# Patient Record
Sex: FEMALE | Race: WHITE | NOT HISPANIC OR LATINO | Employment: FULL TIME | URBAN - NONMETROPOLITAN AREA
[De-identification: names, ages, dates, MRNs, and addresses within clinical notes are randomized per-mention and may not be internally consistent; named-entity substitution may affect disease eponyms.]

---

## 2017-04-19 ENCOUNTER — HISTORY (OUTPATIENT)
Dept: EMERGENCY MEDICINE | Facility: OTHER | Age: 27
End: 2017-04-19

## 2018-01-29 ENCOUNTER — DOCUMENTATION ONLY (OUTPATIENT)
Dept: FAMILY MEDICINE | Facility: OTHER | Age: 28
End: 2018-01-29

## 2018-01-29 RX ORDER — PREDNISONE 10 MG/1
TABLET ORAL
COMMUNITY
Start: 2017-04-19 | End: 2021-09-08

## 2021-09-08 ENCOUNTER — HOSPITAL ENCOUNTER (INPATIENT)
Facility: CLINIC | Age: 31
LOS: 4 days | Discharge: HOME | End: 2021-09-13
Attending: PSYCHIATRY & NEUROLOGY | Admitting: PSYCHIATRY & NEUROLOGY
Payer: MEDICAID

## 2021-09-08 DIAGNOSIS — Z20.822 COVID-19 RULED OUT BY LABORATORY TESTING: ICD-10-CM

## 2021-09-08 DIAGNOSIS — F19.20 CHEMICAL DEPENDENCY (H): ICD-10-CM

## 2021-09-08 DIAGNOSIS — F10.20 UNCOMPLICATED ALCOHOL DEPENDENCE (H): ICD-10-CM

## 2021-09-08 LAB
ALBUMIN SERPL-MCNC: 4 G/DL (ref 3.4–5)
ALCOHOL BREATH TEST: 0.13 (ref 0–0.01)
ALP SERPL-CCNC: 90 U/L (ref 40–150)
ALT SERPL W P-5'-P-CCNC: 237 U/L (ref 0–50)
AMPHETAMINES UR QL SCN: ABNORMAL
ANION GAP SERPL CALCULATED.3IONS-SCNC: 6 MMOL/L (ref 3–14)
AST SERPL W P-5'-P-CCNC: 138 U/L (ref 0–45)
BARBITURATES UR QL: ABNORMAL
BASOPHILS # BLD AUTO: 0.1 10E3/UL (ref 0–0.2)
BASOPHILS NFR BLD AUTO: 1 %
BENZODIAZ UR QL: ABNORMAL
BILIRUB SERPL-MCNC: 0.4 MG/DL (ref 0.2–1.3)
BUN SERPL-MCNC: 9 MG/DL (ref 7–30)
CALCIUM SERPL-MCNC: 8.7 MG/DL (ref 8.5–10.1)
CANNABINOIDS UR QL SCN: ABNORMAL
CHLORIDE BLD-SCNC: 107 MMOL/L (ref 94–109)
CO2 SERPL-SCNC: 27 MMOL/L (ref 20–32)
COCAINE UR QL: ABNORMAL
CREAT SERPL-MCNC: 0.58 MG/DL (ref 0.52–1.04)
EOSINOPHIL # BLD AUTO: 0 10E3/UL (ref 0–0.7)
EOSINOPHIL NFR BLD AUTO: 1 %
ERYTHROCYTE [DISTWIDTH] IN BLOOD BY AUTOMATED COUNT: 13.9 % (ref 10–15)
GFR SERPL CREATININE-BSD FRML MDRD: >90 ML/MIN/1.73M2
GLUCOSE BLD-MCNC: 113 MG/DL (ref 70–99)
HCG UR QL: NEGATIVE
HCT VFR BLD AUTO: 41.4 % (ref 35–47)
HGB BLD-MCNC: 13.7 G/DL (ref 11.7–15.7)
IMM GRANULOCYTES # BLD: 0 10E3/UL
IMM GRANULOCYTES NFR BLD: 0 %
LYMPHOCYTES # BLD AUTO: 1.3 10E3/UL (ref 0.8–5.3)
LYMPHOCYTES NFR BLD AUTO: 20 %
MCH RBC QN AUTO: 31.9 PG (ref 26.5–33)
MCHC RBC AUTO-ENTMCNC: 33.1 G/DL (ref 31.5–36.5)
MCV RBC AUTO: 96 FL (ref 78–100)
MONOCYTES # BLD AUTO: 0.4 10E3/UL (ref 0–1.3)
MONOCYTES NFR BLD AUTO: 6 %
NEUTROPHILS # BLD AUTO: 4.8 10E3/UL (ref 1.6–8.3)
NEUTROPHILS NFR BLD AUTO: 72 %
NRBC # BLD AUTO: 0 10E3/UL
NRBC BLD AUTO-RTO: 0 /100
OPIATES UR QL SCN: ABNORMAL
PLATELET # BLD AUTO: 274 10E3/UL (ref 150–450)
POTASSIUM BLD-SCNC: 3.8 MMOL/L (ref 3.4–5.3)
PROT SERPL-MCNC: 7.6 G/DL (ref 6.8–8.8)
RBC # BLD AUTO: 4.3 10E6/UL (ref 3.8–5.2)
SARS-COV-2 RNA RESP QL NAA+PROBE: NEGATIVE
SODIUM SERPL-SCNC: 140 MMOL/L (ref 133–144)
WBC # BLD AUTO: 6.6 10E3/UL (ref 4–11)

## 2021-09-08 PROCEDURE — 250N000013 HC RX MED GY IP 250 OP 250 PS 637: Performed by: EMERGENCY MEDICINE

## 2021-09-08 PROCEDURE — 99285 EMERGENCY DEPT VISIT HI MDM: CPT | Mod: 25 | Performed by: PSYCHIATRY & NEUROLOGY

## 2021-09-08 PROCEDURE — U0003 INFECTIOUS AGENT DETECTION BY NUCLEIC ACID (DNA OR RNA); SEVERE ACUTE RESPIRATORY SYNDROME CORONAVIRUS 2 (SARS-COV-2) (CORONAVIRUS DISEASE [COVID-19]), AMPLIFIED PROBE TECHNIQUE, MAKING USE OF HIGH THROUGHPUT TECHNOLOGIES AS DESCRIBED BY CMS-2020-01-R: HCPCS | Performed by: PSYCHIATRY & NEUROLOGY

## 2021-09-08 PROCEDURE — 85025 COMPLETE CBC W/AUTO DIFF WBC: CPT | Performed by: PSYCHIATRY & NEUROLOGY

## 2021-09-08 PROCEDURE — C9803 HOPD COVID-19 SPEC COLLECT: HCPCS | Performed by: PSYCHIATRY & NEUROLOGY

## 2021-09-08 PROCEDURE — 82075 ASSAY OF BREATH ETHANOL: CPT | Performed by: PSYCHIATRY & NEUROLOGY

## 2021-09-08 PROCEDURE — 99284 EMERGENCY DEPT VISIT MOD MDM: CPT | Performed by: PSYCHIATRY & NEUROLOGY

## 2021-09-08 PROCEDURE — 36415 COLL VENOUS BLD VENIPUNCTURE: CPT | Performed by: PSYCHIATRY & NEUROLOGY

## 2021-09-08 PROCEDURE — 250N000013 HC RX MED GY IP 250 OP 250 PS 637: Performed by: PSYCHIATRY & NEUROLOGY

## 2021-09-08 PROCEDURE — 80307 DRUG TEST PRSMV CHEM ANLYZR: CPT | Performed by: PSYCHIATRY & NEUROLOGY

## 2021-09-08 PROCEDURE — 90791 PSYCH DIAGNOSTIC EVALUATION: CPT

## 2021-09-08 PROCEDURE — 81025 URINE PREGNANCY TEST: CPT | Performed by: PSYCHIATRY & NEUROLOGY

## 2021-09-08 PROCEDURE — 80053 COMPREHEN METABOLIC PANEL: CPT | Performed by: PSYCHIATRY & NEUROLOGY

## 2021-09-08 RX ORDER — DIAZEPAM 5 MG
5 TABLET ORAL ONCE
Status: COMPLETED | OUTPATIENT
Start: 2021-09-08 | End: 2021-09-08

## 2021-09-08 RX ORDER — DIAZEPAM 5 MG
5 TABLET ORAL ONCE
Status: DISCONTINUED | OUTPATIENT
Start: 2021-09-08 | End: 2021-09-08 | Stop reason: CLARIF

## 2021-09-08 RX ADMIN — NICOTINE POLACRILEX 4 MG: 4 GUM, CHEWING BUCCAL at 18:10

## 2021-09-08 RX ADMIN — DIAZEPAM 5 MG: 5 TABLET ORAL at 15:45

## 2021-09-08 ASSESSMENT — ENCOUNTER SYMPTOMS
NEUROLOGICAL NEGATIVE: 1
AGITATION: 0
SLEEP DISTURBANCE: 1
CONSTITUTIONAL NEGATIVE: 1
CARDIOVASCULAR NEGATIVE: 1
HYPERACTIVE: 0
GASTROINTESTINAL NEGATIVE: 1
MUSCULOSKELETAL NEGATIVE: 1
RESPIRATORY NEGATIVE: 1
HALLUCINATIONS: 0
EYES NEGATIVE: 1

## 2021-09-08 ASSESSMENT — MIFFLIN-ST. JEOR: SCORE: 1370.43

## 2021-09-08 ASSESSMENT — LIFESTYLE VARIABLES: INTOXICATION: 1
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
## 2021-09-08 NOTE — ED PROVIDER NOTES
[facility] EMERGENCY DEPARTMENT ([facility])   September 8, 2021  [address]   History     Chief Complaint   Patient presents with     Alcohol Intoxication     States last drink was #@ 1200 Noon today.  Drinks selzers.  Takes shots also.  Case of beer per day.     The history is provided by the patient and medical records.     Patricia Alberto is a 30 year old female who presents with alcohol intoxication. Patient's last drink was at noon today. She has been drinking hard seltzers, case of beer, and shots of liquor. She last drank at noon today. She breathalized 0.13 on arrival. She has not been in detox nor treatment. She feels her drinking has spiraled out of control. She wakes up feeling nauseous and throwing up, feeling sick. She is concerned about her health and now wants help. She has history of PTSD and ADHD. She denies feeling suicidal nor having hallucinations. She feels she only needs to get into detox and is looking for MI/CD treatment. She admits to engaging in self-cutting behavior when intoxicated. She feels in emotional and behavioral control presently. She admits to smoking THC daily and occasion cocaine use. She last used 5 days ago.    Patient denies acute medical concerns presently. She has no history of withdrawal seizures or DTs. She has had her COVID vaccine and denies any COVID symptoms.    Patient's grandmother referred her here as she works as the [employer] in the [employer] [facility].    Please see DEC Crisis Assessment on 9/8/21 in Epic for further details.    PAST MEDICAL HISTORY:   Past Medical History:   Diagnosis Date     Attention-deficit hyperactivity disorder     post-traumatic stress disorder followed by Judi Puentes.     Concussion with loss of consciousness     and LOC late August after falling down steps, air lifted to [facility] and hospitalized x 4 days.     Encounter for general adult medical examination without abnormal findings     08/27/09     Tinea unguium     No  Comments Provided       PAST SURGICAL HISTORY:   Past Surgical History:   Procedure Laterality Date     CHOLECYSTECTOMY  2015     OTHER SURGICAL HISTORY      [id number],NO PREVIOUS SURGERY       Past medical history, past surgical history, medications, and allergies were reviewed with the patient.     FAMILY HISTORY:   Family History   Problem Relation Age of Onset     Family History Negative Mother         Good Health,44 yrs old     Other - See Comments Father         murdered at 52     Family History Negative Other         Good Health     Family History Negative Other         Good Health     Blood Disease No family hx of         Blood Disease     Heart Disease No family hx of         Heart Disease     Cholelithiasis Father         Cholecystecomy       SOCIAL HISTORY:   Social History     Tobacco Use     Smoking status: Current Every Day Smoker     Packs/day: 0.50     Years: 1.00     Pack years: 0.50     Types: Cigarettes     Smokeless tobacco: Never Used     Tobacco comment: Also vaps nicotine   Substance Use Topics     Alcohol use: Yes     Alcohol/week: 0.0 standard drinks     Social history was reviewed with the patient.       Patient's Medications   New Prescriptions    No medications on file   Previous Medications    AMPHETAMINE-DEXTROAMPHETAMINE (ADDERALL XR PO)        PREDNISONE (DELTASONE) 10 MG TABLET    Take 1 tablet by mouth once daily with a meal. Take 40 mg daily for 1 week, then 30 mg daily for 3 days, then 20 mg daily for 3 days, then 10 mg daily for 3 days.   Modified Medications    No medications on file   Discontinued Medications    No medications on file          Allergies   Allergen Reactions     Morphine Rash        Review of Systems   Constitutional: Negative.    HENT: Negative.    Eyes: Negative.    Respiratory: Negative.    Cardiovascular: Negative.    Gastrointestinal: Negative.    Genitourinary: Negative.    Musculoskeletal: Negative.    Skin: Negative.    Neurological: Negative.   "  Psychiatric/Behavioral: Positive for self-injury and sleep disturbance. Negative for agitation, behavioral problems, hallucinations and suicidal ideas. The patient is not hyperactive.    All other systems reviewed and are negative.        Physical Exam   BP: 130/85  Pulse: 85  Temp: 98.3  F (36.8  C)  Resp: 16  Height: 160 cm (5' 3\")  Weight: 68.1 kg (150 lb 3.2 oz)  SpO2: 97 %      Physical Exam  Vitals and nursing note reviewed.   HENT:      Head: Normocephalic.   Eyes:      Pupils: Pupils are equal, round, and reactive to light.   Pulmonary:      Effort: Pulmonary effort is normal.   Musculoskeletal:         General: Normal range of motion.      Cervical back: Normal range of motion.   Neurological:      General: No focal deficit present.      Mental Status: She is alert.   Psychiatric:         Attention and Perception: Attention and perception normal. She does not perceive auditory or visual hallucinations.         Mood and Affect: Mood and affect normal.         Speech: Speech normal.         Behavior: Behavior normal. Behavior is not agitated, aggressive, hyperactive or combative. Behavior is cooperative.         Thought Content: Thought content normal. Thought content is not paranoid or delusional. Thought content does not include homicidal or suicidal ideation.         Cognition and Memory: Cognition and memory normal.         Judgment: Judgment normal.         ED Course        Procedures              Results for orders placed or performed during the hospital encounter of 09/08/21 (from the past 24 hour(s))   Alcohol breath test POCT   Result Value Ref Range    Alcohol Breath Test 0.13 (A) 0.00 - 0.01   CBC with platelets differential    Narrative    The following orders were created for panel order CBC with platelets differential.  Procedure                               Abnormality         Status                     ---------                               -----------         ------                   "   CBC with platelets and d...[[id number]]                                                   Please view results for these tests on the individual orders.     Medications   diazepam (VALIUM) tablet 5 mg (5 mg Oral Given 9/8/21 1048)             Assessments & Plan (with Medical Decision Making)   Patient with alcohol dependence who feels her drinking has gotten out of control, and she is getting sick in the morning from alcohol adverse effects and withdrawal. She seeks help with detox and subsequent treatment. Patient is voluntary for inpatient medical detox. She is referred for admission here. She is medically cleared.    I have reviewed the nursing notes.    I have reviewed the findings, diagnosis, plan and need for follow up with the patient.    New Prescriptions    No medications on file       Final diagnoses:   Uncomplicated alcohol dependence (H)   Chemical dependency (H)         9/8/2021   [facility] EMERGENCY DEPARTMENT     Chuckie Beltran MD  09/08/21 [id number]

## 2021-09-09 PROBLEM — F19.20 CHEMICAL DEPENDENCY (H): Status: ACTIVE | Noted: 2021-09-09

## 2021-09-09 PROBLEM — F10.20 UNCOMPLICATED ALCOHOL DEPENDENCE (H): Status: ACTIVE | Noted: 2021-09-09

## 2021-09-09 PROCEDURE — 99207 PR CONSULT E&M CHANGED TO SUBSEQUENT LEVEL: CPT | Performed by: PHYSICIAN ASSISTANT

## 2021-09-09 PROCEDURE — 99223 1ST HOSP IP/OBS HIGH 75: CPT | Mod: AI | Performed by: PSYCHIATRY & NEUROLOGY

## 2021-09-09 PROCEDURE — HZ2ZZZZ DETOXIFICATION SERVICES FOR SUBSTANCE ABUSE TREATMENT: ICD-10-PCS | Performed by: PSYCHIATRY & NEUROLOGY

## 2021-09-09 PROCEDURE — 99232 SBSQ HOSP IP/OBS MODERATE 35: CPT | Performed by: PHYSICIAN ASSISTANT

## 2021-09-09 PROCEDURE — 250N000013 HC RX MED GY IP 250 OP 250 PS 637: Performed by: PSYCHIATRY & NEUROLOGY

## 2021-09-09 PROCEDURE — 128N000004 HC R&B CD ADULT

## 2021-09-09 RX ORDER — FOLIC ACID 1 MG/1
1 TABLET ORAL DAILY
Status: DISCONTINUED | OUTPATIENT
Start: 2021-09-09 | End: 2021-09-13 | Stop reason: HOSPADM

## 2021-09-09 RX ORDER — AMOXICILLIN 250 MG
1 CAPSULE ORAL 2 TIMES DAILY PRN
Status: DISCONTINUED | OUTPATIENT
Start: 2021-09-09 | End: 2021-09-13 | Stop reason: HOSPADM

## 2021-09-09 RX ORDER — PRAZOSIN HYDROCHLORIDE 1 MG/1
1 CAPSULE ORAL AT BEDTIME
Status: DISCONTINUED | OUTPATIENT
Start: 2021-09-09 | End: 2021-09-13 | Stop reason: HOSPADM

## 2021-09-09 RX ORDER — TRAZODONE HYDROCHLORIDE 50 MG/1
50 TABLET, FILM COATED ORAL
Status: DISCONTINUED | OUTPATIENT
Start: 2021-09-09 | End: 2021-09-13 | Stop reason: HOSPADM

## 2021-09-09 RX ORDER — HYDROXYZINE HYDROCHLORIDE 25 MG/1
25 TABLET, FILM COATED ORAL EVERY 4 HOURS PRN
Status: DISCONTINUED | OUTPATIENT
Start: 2021-09-09 | End: 2021-09-13 | Stop reason: HOSPADM

## 2021-09-09 RX ORDER — LANOLIN ALCOHOL/MO/W.PET/CERES
100 CREAM (GRAM) TOPICAL DAILY
Status: DISCONTINUED | OUTPATIENT
Start: 2021-09-09 | End: 2021-09-13 | Stop reason: HOSPADM

## 2021-09-09 RX ORDER — ACETAMINOPHEN 325 MG/1
650 TABLET ORAL EVERY 4 HOURS PRN
Status: DISCONTINUED | OUTPATIENT
Start: 2021-09-09 | End: 2021-09-09

## 2021-09-09 RX ORDER — ATENOLOL 50 MG/1
50 TABLET ORAL DAILY PRN
Status: DISCONTINUED | OUTPATIENT
Start: 2021-09-09 | End: 2021-09-13 | Stop reason: HOSPADM

## 2021-09-09 RX ORDER — DIAZEPAM 5 MG
5-20 TABLET ORAL EVERY 30 MIN PRN
Status: DISCONTINUED | OUTPATIENT
Start: 2021-09-09 | End: 2021-09-13 | Stop reason: HOSPADM

## 2021-09-09 RX ORDER — MULTIPLE VITAMINS W/ MINERALS TAB 9MG-400MCG
1 TAB ORAL DAILY
Status: DISCONTINUED | OUTPATIENT
Start: 2021-09-09 | End: 2021-09-13 | Stop reason: HOSPADM

## 2021-09-09 RX ORDER — MAGNESIUM HYDROXIDE/ALUMINUM HYDROXICE/SIMETHICONE 120; 1200; 1200 MG/30ML; MG/30ML; MG/30ML
30 SUSPENSION ORAL EVERY 4 HOURS PRN
Status: DISCONTINUED | OUTPATIENT
Start: 2021-09-09 | End: 2021-09-13 | Stop reason: HOSPADM

## 2021-09-09 RX ORDER — NICOTINE 21 MG/24HR
1 PATCH, TRANSDERMAL 24 HOURS TRANSDERMAL DAILY
Status: DISCONTINUED | OUTPATIENT
Start: 2021-09-09 | End: 2021-09-13 | Stop reason: HOSPADM

## 2021-09-09 RX ORDER — MIRTAZAPINE 15 MG/1
15 TABLET, FILM COATED ORAL AT BEDTIME
Status: DISCONTINUED | OUTPATIENT
Start: 2021-09-09 | End: 2021-09-13 | Stop reason: HOSPADM

## 2021-09-09 RX ADMIN — MIRTAZAPINE 15 MG: 15 TABLET, FILM COATED ORAL at 20:33

## 2021-09-09 RX ADMIN — DIAZEPAM 10 MG: 5 TABLET ORAL at 16:35

## 2021-09-09 RX ADMIN — PRAZOSIN HYDROCHLORIDE 1 MG: 1 CAPSULE ORAL at 20:33

## 2021-09-09 RX ADMIN — DIAZEPAM 10 MG: 5 TABLET ORAL at 20:33

## 2021-09-09 RX ADMIN — DIAZEPAM 10 MG: 5 TABLET ORAL at 09:05

## 2021-09-09 RX ADMIN — DIAZEPAM 10 MG: 5 TABLET ORAL at 06:47

## 2021-09-09 RX ADMIN — DIAZEPAM 10 MG: 5 TABLET ORAL at 12:45

## 2021-09-09 RX ADMIN — NICOTINE 1 PATCH: 14 PATCH, EXTENDED RELEASE TRANSDERMAL at 09:05

## 2021-09-09 ASSESSMENT — ACTIVITIES OF DAILY LIVING (ADL)
HYGIENE/GROOMING: INDEPENDENT
DRESS: STREET CLOTHES;INDEPENDENT
ORAL_HYGIENE: INDEPENDENT
ORAL_HYGIENE: INDEPENDENT
LAUNDRY: WITH SUPERVISION
DRESS: INDEPENDENT
HYGIENE/GROOMING: HANDWASHING;INDEPENDENT

## 2021-09-09 NOTE — PLAN OF CARE
Behavioral Team Discussion: (9/9/2021)    Continued Stay Criteria/Rationale: Patient admitted for alcohol withdrawal, complicated.  Plan: The following services will be provided to the patient; psychiatric assessment, medication management, therapeutic milieu, individual and group support, and skills groups.   Participants: 3A Provider: Dr. Constance Edgar MD; 3A RN's: Wellington Robbins, RN; 3A CM's: Rae Sethi [facility]  Summary/Recommendation: Providers will assess today for treatment recommendations, discharge planning, and aftercare plans. CM will meet with pt for discharge planning.   Medical/Physical: Internal medicine consult to be completed 9/9/2021.  Precautions:   Behavioral Orders   Procedures     Code 1 - Restrict to Unit     Routine Programming     As clinically indicated     Status 15     Every 15 minutes.     Withdrawal precautions     Rationale for change in precautions or plan: N/A  Progress: No Change.

## 2021-09-09 NOTE — PHARMACY-ADMISSION MEDICATION HISTORY
Admission Medication History Completed by Pharmacy    See [facility] Admission Navigator for allergy information, preferred outpatient pharmacy, prior to admission medications and immunization status.     Medication History Sources:     Patient    Fill history    Changes made to PTA medication list (reason):    Added: None    Deleted:   o Amphetamine-Dextroamphetamine (Adderall XR); No instructions listed - Patient reports that she has not taken this medication in over a year  o Prednisone 10 mg tablet; Take 40 mg daily for 1 week, then 30 mg daily for 3 days, then 20 mg daily for 3 days, then 10 mg daily for 3 days - Patient reports that she has not taken this medication in a couple years.    Changed: None    Additional Information:    Patient reports not taking any routine medications/supplements/vitamins. Her fill history reflects this.    Prior to Admission medications    Not on File       Date completed: 09/08/21    Medication history completed by: JEREMY Cook, Pharmacy Intern

## 2021-09-09 NOTE — PLAN OF CARE
"  Problem: Adult Inpatient Plan of Care  Goal: Plan of Care Review  Outcome: No Change     Patricia is a 30 year old female admitted from [facility] due to alcohol intoxication. She is seeking for detox.     Pt.reports she has been drinking \"all my life\". She has never been sober for three months. Her liver function tests are elevated. She identified recent stressors that worsened her drinking as: her best friend committed suicide a few months ago; lost her job on Saturday (9/4/2021) due to going to work drunk; and going through a \"messy\"break up from a five year old relationship.     She has a history of self cutting while intoxicated. Has superficial wounds on left wrist. Wounds look clean, dry, and intact upon assessment. No concerns for infection at this point.     Started on MSSA with valium to manage withdrawal symptoms. Denied SI/SIB/hallucinations. Denied any withdrawal seizures and DTs history. Has mental health diagnosis of PTSD and ADHD. Will continue to monitor.   "

## 2021-09-09 NOTE — ED NOTES
Patricia MARTEL Remy  September 8, 2021  SAFE Note    Critical Safety Issues:       Current Suicidal Ideation/Self-Injurious Concerns/Methods: None - N/A      Current or Historical Inappropriate Sexual Behavior: No      Current or Historical Aggression/Homicidal Ideation: None - N/A      Triggers: N/A    Updated care team: Yes:     For additional details see full [facility] assessment.       PADMINI Hidalgo

## 2021-09-09 NOTE — H&P
Patricia Alberto is a 30 year old female     History was provided by MARCK who was a fair  historian.   CHIEF COMPLAINT: Stop drinking    HISTORY OF PRESENT ILLNESS:    Patient is 30-year-old  female.  Patient came to the emergency room wanting help for her drinking.  Patient reports she will showed up intoxicated to work and was fired she has had a bad break-up of family intervened and asked her to come get help.  Patient has been using the following substances: Alcohol  Started at age 13, became a problem at 2021    Patient has tolerance, withdrawal, progressive use, loss of control, spending more time and more amount than intended. Patient has made attempts to quit, is experiencing cravings, and reports negative consequences.  She has no history of DTs or seizures  He wakes up he is nauseous throws up   She has been using marijuana daily    She has been using cocaine and snorting it since June    She has tolerance progressive loss of control               Denies thoughts of suicide or harming others.  She has however engage in self-injurious behavior while intoxicated    Denies auditory or visual hallucinations.     Patient smokes half a pack a day    Patient denied any gambling    She denies IV use or any other illicit drugs  PSYCHIATRIC REVIEW OF SYSTEMS:         Psychiatric Review of Systems:   Depression:   deneid any  depressed mood, suicidal ideation, decreased interest, changes in sleep, changes in appetite, guilt, hopelessness, helplessness, impaired concentration, decreased energy, irritability.   Denies: depressed mood, suicidal ideation, decreased interest, changes in sleep, changes in appetite, guilt, hopelessness, helplessness, impaired concentration, decreased energy, irritability.  Cecelia:   deied  sleeplessness, impulsiveness, racing thoughts, increased goal-directed activities, pressured speech, increase in energy  Cecelia Feeling euphoric,Distractible,Impulsive,Grandiose,Talking  excessively,Have energy without sleeping,Mood swings,Irritability  Denies: sleeplessness, increased goal-directed activities, abrupt increase in energy, pressured speech  Psychosis:     Denies: visual hallucinations, auditory hallucinations, paranoia  Anxiety:   Reports: excessive worries that are difficult to control for the past 6 months,   Chronic anxiety , not able to stop worrying impacting sleep, poor conc, irritable , muscle tension    Anxiety  Pt has following s/o of anxiety  Feeling anxious all the time,Excessive worry,Not able to stop worrying,Impacting sleep,Concentration,Muscle tension,Irritability,Fatigue  Denied any Panic attacks      Denies: worries that are difficult to control for the past 6 months, panic attacks  PTSD:   Reports: re-experiencing past trauma, nightmares, itrust issues, flashbacks,increased arousal, avoidance of traumatic stimuli, impaired function.    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  ED:     Denies: restriction, binging, purging.    H/o adhd     Symptoms of borderline personality disorder include a fear of abandonment, unstable self-image, impulsive behavior, dissociative feeling, intense anger, unstable personal relationships, chronic feelings of boredom, periods of intense depressed mood.              PSYCHIATRIC HISTORY     Previous diagnoses: ptsd, anxiety        Past court commitments: none  SIB /SUICIDE ATTEMPTS NONE  Psych Hosp :none  Outpatient Programs none  Inpatient cd trt none  Out pt cd trt none    PAST PSYCH MED TRIALS    lexapro prozac paxil celexa     SOCIAL HISTORY                                                                         Single no kids , lives roommate   She reports that her lupus her mother left when she was 2 years old she spent a lot of time in the presence. all she witnessed a lot of trauma , her mother and father were fighting a lot.  She also reports she was home and her  father was murdered at the age of 17 she struggles in PTSD from this incident.  She did complete high school because a  but got fired.        Family History:   FAMILY HISTORY:   Family History   Problem Relation Age of Onset     Family History Negative Mother         Good Health,44 yrs old     Other - See Comments Father         murdered at 52     Family History Negative Other         Good Health     Family History Negative Other         Good Health     Blood Disease No family hx of         Blood Disease     Heart Disease No family hx of         Heart Disease     Cholelithiasis Father         Cholecystecomy     Family Mental Health History-  Mother has mental illness     Substance Use Problems - present for mother is  Sober  meth/cocaine              PTA Medications:     No medications prior to admission.          Allergies:     Allergies   Allergen Reactions     Morphine Rash          Labs:     Recent Results (from the past 48 hour(s))   Alcohol breath test POCT    Collection Time: 09/08/21  3:45 PM   Result Value Ref Range    Alcohol Breath Test 0.13 (A) 0.00 - 0.01   Comprehensive metabolic panel    Collection Time: 09/08/21  5:33 PM   Result Value Ref Range    Sodium 140 133 - 144 mmol/L    Potassium 3.8 3.4 - 5.3 mmol/L    Chloride 107 94 - 109 mmol/L    Carbon Dioxide (CO2) 27 20 - 32 mmol/L    Anion Gap 6 3 - 14 mmol/L    Urea Nitrogen 9 7 - 30 mg/dL    Creatinine 0.58 0.52 - 1.04 mg/dL    Calcium 8.7 8.5 - 10.1 mg/dL    Glucose 113 (H) 70 - 99 mg/dL    Alkaline Phosphatase 90 40 - 150 U/L     (H) 0 - 45 U/L     (H) 0 - 50 U/L    Protein Total 7.6 6.8 - 8.8 g/dL    Albumin 4.0 3.4 - 5.0 g/dL    Bilirubin Total 0.4 0.2 - 1.3 mg/dL    GFR Estimate >90 >60 mL/min/1.73m2   Asymptomatic COVID-19 Virus (Coronavirus) by PCR Nasopharyngeal    Collection Time: 09/08/21  5:33 PM    Specimen: Nasopharyngeal; Swab   Result Value Ref Range    SARS CoV2 PCR Negative Negative   CBC with platelets and  "differential    Collection Time: 09/08/21  5:33 PM   Result Value Ref Range    WBC Count 6.6 4.0 - 11.0 10e3/uL    RBC Count 4.30 3.80 - 5.20 10e6/uL    Hemoglobin 13.7 11.7 - 15.7 g/dL    Hematocrit 41.4 35.0 - 47.0 %    MCV 96 78 - 100 fL    MCH 31.9 26.5 - 33.0 pg    MCHC 33.1 31.5 - 36.5 g/dL    RDW 13.9 10.0 - 15.0 %    Platelet Count 274 150 - 450 10e3/uL    % Neutrophils 72 %    % Lymphocytes 20 %    % Monocytes 6 %    % Eosinophils 1 %    % Basophils 1 %    % Immature Granulocytes 0 %    NRBCs per 100 WBC 0 <1 /100    Absolute Neutrophils 4.8 1.6 - 8.3 10e3/uL    Absolute Lymphocytes 1.3 0.8 - 5.3 10e3/uL    Absolute Monocytes 0.4 0.0 - 1.3 10e3/uL    Absolute Eosinophils 0.0 0.0 - 0.7 10e3/uL    Absolute Basophils 0.1 0.0 - 0.2 10e3/uL    Absolute Immature Granulocytes 0.0 <=0.0 10e3/uL    Absolute NRBCs 0.0 10e3/uL   HCG qualitative urine (UPT)    Collection Time: 09/08/21  5:42 PM   Result Value Ref Range    hCG Urine Qualitative Negative Negative   Drug abuse screen 1 urine (ED)    Collection Time: 09/08/21  5:42 PM   Result Value Ref Range    Amphetamines Urine Screen Negative Screen Negative    Barbiturates Urine Screen Negative Screen Negative    Benzodiazepines Urine Screen Negative Screen Negative    Cannabinoids Urine Screen Positive (A) Screen Negative    Cocaine Urine Screen Positive (A) Screen Negative    Opiates Urine Screen Negative Screen Negative         BP (!) 134/90   Pulse 67   Temp 97.6  F (36.4  C) (Temporal)   Resp 16   Ht 1.6 m (5' 3\")   Wt 68.1 kg (150 lb 3.2 oz)   LMP 09/03/2021 (Exact Date)   SpO2 95%   Breastfeeding No   BMI 26.61 kg/m    Weight is 150 lbs 3.2 oz  Body mass index is 26.61 kg/m .    Physical Exam:     ROS: 10 point ROS neg other than the symptoms noted above in the HPI.            Past Medical History:   PAST MEDICAL HISTORY:   Past Medical History:   Diagnosis Date     Attention-deficit hyperactivity disorder     post-traumatic stress disorder followed by " Judi Puentes.     Concussion with loss of consciousness     and LOC late August after falling down steps, air lifted to [facility] and hospitalized x 4 days.     Encounter for general adult medical examination without abnormal findings     08/27/09     Tinea unguium     No Comments Provided       PAST SURGICAL HISTORY:   Past Surgical History:   Procedure Laterality Date     CHOLECYSTECTOMY  2015     OTHER SURGICAL HISTORY      [id number],NO PREVIOUS SURGERY       -    -           MENTAL STATUS EXAM:      Constitutional: General appearance of patient:  Appearance:  awake, alert, appeared as age stated, adequate groomed and slightly unkempt  Attitude:  cooperative  Eye Contact:  good  Mood:  good  Affect:  congruent   Speech:  clear, coherent normal rate   Psychomotor Behavior:  no evidence of tardive dyskinesia, dystonia, or tics  Thought Process:  logical, linear and goal oriented  Associations:  no loose associations  Thought Content:  no evidence of psychotic thought and active suicidal ideation present  Denied any active suicidal /homicidation ideation plan intent   Insight:  fair  Judgment:  fair  Oriented to:  time, person, and place  Attention Span and Concentration:  intact  Recent and Remote Memory:  intact  Language:  english with appropriate syntax and vocabulary  Fund of Knowledge: appropriate  Muscle Strength and Tone: normal  Gait and Station: Normal     There are no abnormal or psychotic thoughts, no preoccupations, no overvalued ideas, no rumination, no obsessions, no compulsions, no somatic concerns, no hypochrondriasis, no ideas of reference, and no delusions.  Patient denies homicidal thoughts.   Patient denies suicidal thoughts.  Patient appears to have good judgment and good insight.     Musculoskeletal: Patient shows no abnormalities of motor activity: there is no tremor, no tic, and no dystonia.  There is no apparent muscle atrophy, strength and tone appear normal, and there are no  abnormal movements.  Patient has normal gait and stance.    DISCUSSION:         Assessment:       Patient has a biological predisposition with family history positive for mental health chemical dependency  Psychologically patient is experiencing abusing alcohol marijuana cocaine she experiences PTSD  Patient has these particular stressors recent break-up being fired from her job  Patient has chronic illness exacerbation leading to hospitalization progression as described.     Patient has been unable to stop using drugs in the community due to both physical and psychological symptoms.  Continued use will put the patient at risk for medical and/or psychiatric complications.      Inpatient psychiatric hospitalization is warranted at this time for safety, stabilization, and possible adjustment in medications.       Diagnoses:    Alcohol use disorder severe  Alcohol withdrawal severe  Marijuana use disorder severe  Nicotine use disorder moderate  Cocaine use disorder moderate  PTSD chronic  Anxiety NOS rule out generalized anxiety disorder  Trichotillomania          Plan:   Problem list  1#alcohol use disorder severe alcohol withdrawal severe     - [facility] protocol using Valium for management of alcohol withdrawal  Patient has a blood pressure 134/90 eating disturbance tremor agitation sweats she scored a 10 and received 10 mg of diazepam since her admission she is required 25 mg of diazepam  - Continue thiamine, folate, and multivitamin daily    2#elevated liver enzymes AST is 138  most likely from alcoholism    3#PTSD patient is willing to take Minipress and Remeron 15 mg patient has tried SSRIs SNRIs and she had a bad reaction SSRIs like Prozac Lexapro believes it made him feel manic  4#she will have symptomatic detox from cocaine        - Consider anti-craving medications prior to discharge. Pt willing to review additional information about both naltrexone and Antabuse.    Alcohol withdrawal nausea prn Zofran as  needed for nausea     hydroxyzine 25 mg q4h prn for acute anxiety  Trazodone 50 mg at bedtime prn for sleep disturbances       Patient has been unable to stop using drugs in the community due to both physical and psychological symptoms.  Continued use will put the patient at risk for medical and/or psychiatric complications.    I HAVE REVIEWED LABS WITH PT AND TALKED ABOUT RESULTS WITH PT  I HAVE REVIEWED AND SUMMARIZED OLD RECORDS including his medication reconcilation of his home medications  and PDMP   I HAVE SPOKEN WITH RN ABOUT MEDICATIONS AND DETOX SCORES  I HAVE SPOKEN WITH CM ABOUT PTS TREATMENT OPTIONS     Discussed in detail about patient's smoking patient was advised to quit patient was told about the impact of smoking.  Patient's willingness to quit was assessed.  I provided methods and skills for cessation including medication management nicotine gum patch.  Patient did not set a quit date.  Patient is interested in quitting .we discussed pharmacotherapy options .patient agreed to take nicotine gum patch lozenge.  We discussed behavioral change techniques when craving nicotine including deep breathing drinking glass of water, taking a walk.            Laboratory/Imaging:    Liver Function Studies -   Recent Labs   Lab Test 09/08/21  1733   PROTTOTAL 7.6   ALBUMIN 4.0   BILITOTAL 0.4   ALKPHOS 90   *   *      Last Comprehensive Metabolic Panel:  Sodium   Date Value Ref Range Status   09/08/2021 140 133 - 144 mmol/L Final   12/07/2015 139 134 - 143 mmol/L      Potassium   Date Value Ref Range Status   09/08/2021 3.8 3.4 - 5.3 mmol/L Final   12/07/2015 3.8 3.5 - 5.1 mmol/L      Chloride   Date Value Ref Range Status   09/08/2021 107 94 - 109 mmol/L Final   12/07/2015 108 (H) 98 - 107 mmol/L      Carbon Dioxide   Date Value Ref Range Status   12/07/2015 28 21 - 31 mmol/L      Carbon Dioxide (CO2)   Date Value Ref Range Status   09/08/2021 27 20 - 32 mmol/L Final     Anion Gap   Date Value Ref  Range Status   09/08/2021 6 3 - 14 mmol/L Final     Glucose   Date Value Ref Range Status   09/08/2021 113 (H) 70 - 99 mg/dL Final   12/07/2015 72 70 - 105 mg/dL      Urea Nitrogen   Date Value Ref Range Status   09/08/2021 9 7 - 30 mg/dL Final   12/07/2015 10 7 - 25 mg/dL      Creatinine   Date Value Ref Range Status   09/08/2021 0.58 0.52 - 1.04 mg/dL Final   12/07/2015 0.80 0.70 - 1.30 mg/dL      GFR Estimate   Date Value Ref Range Status   09/08/2021 >90 >60 mL/min/1.73m2 Final     Comment:     As of July 11, 2021, eGFR is calculated by the CKD-EPI creatinine equation, without race adjustment. eGFR can be influenced by muscle mass, exercise, and diet. The reported eGFR is an estimation only and is only applicable if the renal function is stable.     Calcium   Date Value Ref Range Status   09/08/2021 8.7 8.5 - 10.1 mg/dL Final   12/07/2015 9.8 8.6 - 10.3 mg/dL      Bilirubin Total   Date Value Ref Range Status   09/08/2021 0.4 0.2 - 1.3 mg/dL Final   12/07/2015 0.6 0.3 - 1.0 mg/dL      Alkaline Phosphatase   Date Value Ref Range Status   09/08/2021 90 40 - 150 U/L Final   12/07/2015 40 34 - 104 IU/L      ALT   Date Value Ref Range Status   09/08/2021 237 (H) 0 - 50 U/L Final     AST   Date Value Ref Range Status   09/08/2021 138 (H) 0 - 45 U/L Final                   Medical treatment/interventions:  Medical concerns: As above    - Consults: IM consult placed. Appreciate assistance.     Legal Status: Voluntary     Safety Assessment:   Checks: Status 15  Pt has not required locked seclusion or restraints in the past 24 hours to maintain safety, please refer to RN documentation for further details.    The risks, benefits, alternatives and side effects have been discussed and are understood by the patient.       Patient will be treated in therapeutic milieu with appropriate individual and group therapies as described.  Disposition: Pending clinical stabilization. Pt does  appear interested in COMPLETE DETOX AND DO  "TRT  Length of stay 3-5 days        \"Much or all of the text in this note was generated through the use of Dragon Dictate voice to text software. Errors in spelling or words which appear to be out of contact are unintentional, may be present due having escaped editing\"     "

## 2021-09-09 NOTE — CONSULTS
[facility]  Consult Note - Hospitalist Service       Date of Admission:  9/8/2021    Consult Requested by: Psychiatry  Reason for Consult: Co-manage detox       ASSESSMENT & PLAN:         Patricia Alberto is a 30 year old female with PMH of ADHD and alcohol use disorder who was admitted for detox.     # Alcohol dependence:  Longstanding history of alcohol abuse. Escalating use recently d/t social stressors. Currently reporting mild symptoms of withdrawal. No history of withdrawal seizures or DT's. LFT's abnormal (see below). No other complications noted.  - Management per primary team    # Transaminitis:  Hepatocellular pattern. Bilirubin and Alk Phos normal. No risk factors for viral hepatitis. Completed Hep B vaccine series in 2006 per records. Suspect 2/2 alcohol use despite AST:ALT not 2:1. DDx includes resolving alcoholic hepatitis, alcoholic steatohepatitis, NAFLD, and less likely viral.   - Repeat LFTs in AM         The patient's care was discussed with the Primary team.    JEFF [facility]  Securely message with the Vocera Web Console (learn more here)  Text page via [facility] Paging/Directory      Clinically Significant Risk Factors Present on Admission                   ______________________________________________________________________    Chief Complaint   Alcohol detox    History is obtained from the patient    History of Present Illness   Patricia Alberto is a 30 year old female who was admitted for alcohol detox. She admits to a longstanding history of alcohol abuse which has escalated in past months d/t social stressors. She admits to drinking a case of beer, ~15 shots of hard alcohol, and hard seltzers daily. She frequently blacks out. She denies any history of severe withdrawal symptoms in the past. No history of withdrawal seizures or DT's. She has never been to detox before. She currently  reports mild to moderate shakes, nausea, and headache. She denies any vomiting, abdominal pain, or diarrhea.     She denies any other complaints.    Review of Systems   The 10 point Review of Systems is negative other than noted in the HPI or here.     Past Medical History    I have reviewed this patient's medical history and updated it with pertinent information if needed.   Past Medical History:   Diagnosis Date     Attention-deficit hyperactivity disorder     post-traumatic stress disorder followed by Judi Puentes.     Concussion with loss of consciousness     and LOC late August after falling down steps, air lifted to [facility] and hospitalized x 4 days.     Encounter for general adult medical examination without abnormal findings     08/27/09     Tinea unguium     No Comments Provided       Past Surgical History   I have reviewed this patient's surgical history and updated it with pertinent information if needed.  Past Surgical History:   Procedure Laterality Date     CHOLECYSTECTOMY  2015     OTHER SURGICAL HISTORY      [id number],NO PREVIOUS SURGERY       Social History   I have reviewed this patient's social history and updated it with pertinent information if needed.  Social History     Tobacco Use     Smoking status: Current Every Day Smoker     Packs/day: 0.50     Years: 1.00     Pack years: 0.50     Types: Cigarettes     Smokeless tobacco: Never Used     Tobacco comment: Also vaps nicotine   Substance Use Topics     Alcohol use: Yes     Alcohol/week: 0.0 standard drinks     Drug use: Yes     Types: Other, Marijuana, Cocaine     Comment: Last used cocaine on Friday uses every day.  Thc daily ,last used Friday       Family History   I have reviewed this patient's family history and updated it with pertinent information if needed.  Family History   Problem Relation Age of Onset     Family History Negative Mother         Good Health,44 yrs old     Other - See Comments Father         murdered at 52      Family History Negative Other         Good Health     Family History Negative Other         Good Health     Blood Disease No family hx of         Blood Disease     Heart Disease No family hx of         Heart Disease     Cholelithiasis Father         Cholecystecomy       Medications   I have reviewed this patient's current medications  No medications prior to admission.       Allergies   Allergies   Allergen Reactions     Morphine Rash       Physical Exam   Vital Signs: Temp: 97.6  F (36.4  C) Temp src: Temporal BP: (Abnormal) 134/90 Pulse: 67   Resp: 16 SpO2: 95 % O2 Device: None (Room air)    Weight: 150 lbs 3.2 oz    GENERAL:  Awake. Alert. NAD.    HEENT:  Multiple piercing. Otherwise unremarkable.  CV:  RRR. S1, S2. No murmur.   PULM:  Normal effort. CTAB. No wheezing, rhonchi or rales.  GI:  Abdomen soft, non-distended. Active bowel sounds. No tenderness.    NEURO:  Grossly non-focal. Moves all extremities.    EXTREMITIES:  No peripheral edema. No calf tenderness.   SKIN:  Dry. No visible rash.     Data   Recent Labs   Lab 09/08/21  1733      POTASSIUM 3.8   CHLORIDE 107   CO2 27   ANIONGAP 6   BUN 9   CR 0.58   REMI 8.7   PROTTOTAL 7.6   ALBUMIN 4.0   BILITOTAL 0.4   ALKPHOS 90   *   *     Recent Labs   Lab 09/08/21  1733   WBC 6.6   RBC 4.30   HGB 13.7   HCT 41.4   MCV 96   MCH 31.9   MCHC 33.1   RDW 13.9        No lab results found in last 7 days.     Glucose Values Latest Ref Rng & Units 9/8/2021   Bedside Glucose (mg/dl )  - --   GLUCOSE 70 - 99 mg/dL 113(H)   Some recent data might be hidden        All labs personally reviewed in [employer].  See A&P for additional results.     Unresulted Labs Ordered in the Past 30 Days of this Admission     No orders found for last 31 day(s).

## 2021-09-09 NOTE — PLAN OF CARE
"Problem: Substance Withdrawal  Goal: Substance Withdrawal  Description: Signs and symptoms of listed problems will be absent or manageable.  Outcome: No Change     Patient has spent the majority of the day in bed.  MSSA 10 & 11 requiring valum 10 mg X 2 this shift. She reports that she is tired and has no appetite, feels anxious 10/10, mood is depressed without thoughts, plan or intent for self harm. Affect is blunted/flat and irritable, denies SI/SIB/HI. Pt denies auditory/visual hallucinations. Patient verbally contracts for safety on the unit. She reports poor sleep and \"never eats.\"  Pt has superficial healing cuts on bilateral wrist not requring any treatment.  Patient is tolerating medications well, denies any current side effects. Patient discharge plans pending and denies any unmet needs at this time.  "

## 2021-09-09 NOTE — PROGRESS NOTES
Case Management Note  9/9/2021    Met with pt in AM to discuss discharge planning. Pt reports wnating treatment. Pt reports she has never attempted this before. Pt tearful, states she just wants to say goodbye before she goes. Assured pt many programs are now allowing at least some visitation, and that is not goodbye forever. Pt reports her mom wants her to go to a women's only treatment, pt has no preference.  Pt completed paperwork for CD assessment however due to short-staffing this could not be completed today.     Per financial counseling, financial counselor Benita completed MA application with pt today. Pt placed on LP priority list in case that is best placement option for pt, however pt would like trauma-focused treatment.     Rae Sethi, LPCC, LADC

## 2021-09-09 NOTE — PROGRESS NOTES
09/09/21 0057   Patient Belongings   Did you bring any home meds/supplements to the hospital?  No   Patient Belongings remains with patient;locker   Patient Belongings Remaining with Patient clothing;glasses   Patient Belongings Put in Hospital Secure Location (Security or Locker, etc.) bracelet;cell phone/electronics;shoes   Belongings Search Yes   Clothing Search Yes   Second Staff Nita CHANCE and Anne-Marie BLEVINS.   Comment All of patient's belongings are in a bin, her discharge bin and in her room     BIN:  Shoes    DISCHARGE BIN:  Phone, mask and a white fancy bracelet   clothing in bin- 09/11/21    1 t shirt, One shirt    ROOM:  Some clothing and a pair of glasses    2 yoga pants , 2 Bras, 2 underwear,  2 long sleeve shirts [id number]        A               Admission:  I am responsible for any personal items that are not sent to the safe or pharmacy.  [facility] is not responsible for loss, theft or damage of any property in my possession.    Signature:  _________________________________ Date: _______  Time: _____                                              Staff Signature:  ____________________________ Date: ________  Time: _____      2nd Staff person, if patient is unable/unwilling to sign:    Signature: ________________________________ Date: ________  Time: _____     Discharge:  [facility] has returned all of my personal belongings:    Signature: _________________________________ Date: ________  Time: _____                                          Staff Signature:  ____________________________ Date: ________  Time: _____

## 2021-09-10 LAB
ALBUMIN SERPL-MCNC: 3.5 G/DL (ref 3.4–5)
ALP SERPL-CCNC: 89 U/L (ref 40–150)
ALT SERPL W P-5'-P-CCNC: 215 U/L (ref 0–50)
AST SERPL W P-5'-P-CCNC: 134 U/L (ref 0–45)
BILIRUB DIRECT SERPL-MCNC: 0.2 MG/DL (ref 0–0.2)
BILIRUB SERPL-MCNC: 0.7 MG/DL (ref 0.2–1.3)
CHOLEST SERPL-MCNC: 205 MG/DL
FOLATE SERPL-MCNC: 10.6 NG/ML
GGT SERPL-CCNC: 304 U/L (ref 0–40)
HDLC SERPL-MCNC: 130 MG/DL
LDLC SERPL CALC-MCNC: 65 MG/DL
NONHDLC SERPL-MCNC: 75 MG/DL
PROT SERPL-MCNC: 7.1 G/DL (ref 6.8–8.8)
TRIGL SERPL-MCNC: 50 MG/DL
TSH SERPL DL<=0.005 MIU/L-ACNC: 1.23 MU/L (ref 0.4–4)
VIT B12 SERPL-MCNC: 729 PG/ML (ref 193–986)

## 2021-09-10 PROCEDURE — 36415 COLL VENOUS BLD VENIPUNCTURE: CPT | Performed by: PSYCHIATRY & NEUROLOGY

## 2021-09-10 PROCEDURE — 250N000013 HC RX MED GY IP 250 OP 250 PS 637: Performed by: PSYCHIATRY & NEUROLOGY

## 2021-09-10 PROCEDURE — 99232 SBSQ HOSP IP/OBS MODERATE 35: CPT | Performed by: PSYCHIATRY & NEUROLOGY

## 2021-09-10 PROCEDURE — 128N000004 HC R&B CD ADULT

## 2021-09-10 PROCEDURE — 82465 ASSAY BLD/SERUM CHOLESTEROL: CPT | Performed by: PSYCHIATRY & NEUROLOGY

## 2021-09-10 PROCEDURE — 99207 PR CDG-MDM COMPONENT: MEETS MODERATE - UP CODED: CPT | Performed by: PSYCHIATRY & NEUROLOGY

## 2021-09-10 PROCEDURE — 82607 VITAMIN B-12: CPT | Performed by: PSYCHIATRY & NEUROLOGY

## 2021-09-10 PROCEDURE — 82977 ASSAY OF GGT: CPT | Performed by: PSYCHIATRY & NEUROLOGY

## 2021-09-10 PROCEDURE — 80076 HEPATIC FUNCTION PANEL: CPT | Performed by: PHYSICIAN ASSISTANT

## 2021-09-10 PROCEDURE — 84443 ASSAY THYROID STIM HORMONE: CPT | Performed by: PSYCHIATRY & NEUROLOGY

## 2021-09-10 PROCEDURE — 82746 ASSAY OF FOLIC ACID SERUM: CPT | Performed by: PSYCHIATRY & NEUROLOGY

## 2021-09-10 PROCEDURE — H2035 A/D TX PROGRAM, PER HOUR: HCPCS | Mod: HQ

## 2021-09-10 RX ADMIN — DIAZEPAM 10 MG: 5 TABLET ORAL at 08:18

## 2021-09-10 RX ADMIN — DIAZEPAM 10 MG: 5 TABLET ORAL at 04:19

## 2021-09-10 RX ADMIN — NICOTINE 1 PATCH: 14 PATCH, EXTENDED RELEASE TRANSDERMAL at 19:01

## 2021-09-10 RX ADMIN — DIAZEPAM 10 MG: 5 TABLET ORAL at 00:49

## 2021-09-10 RX ADMIN — FOLIC ACID 1 MG: 1 TABLET ORAL at 08:18

## 2021-09-10 RX ADMIN — ATENOLOL 50 MG: 50 TABLET ORAL at 08:18

## 2021-09-10 RX ADMIN — MIRTAZAPINE 15 MG: 15 TABLET, FILM COATED ORAL at 21:31

## 2021-09-10 RX ADMIN — NICOTINE 1 PATCH: 14 PATCH, EXTENDED RELEASE TRANSDERMAL at 08:18

## 2021-09-10 RX ADMIN — TRAZODONE HYDROCHLORIDE 50 MG: 50 TABLET ORAL at 23:54

## 2021-09-10 RX ADMIN — MULTIPLE VITAMINS W/ MINERALS TAB 1 TABLET: TAB at 08:18

## 2021-09-10 RX ADMIN — THIAMINE HCL TAB 100 MG 100 MG: 100 TAB at 08:18

## 2021-09-10 RX ADMIN — HYDROXYZINE HYDROCHLORIDE 25 MG: 25 TABLET, FILM COATED ORAL at 23:54

## 2021-09-10 RX ADMIN — PRAZOSIN HYDROCHLORIDE 1 MG: 1 CAPSULE ORAL at 21:31

## 2021-09-10 ASSESSMENT — ACTIVITIES OF DAILY LIVING (ADL)
ORAL_HYGIENE: INDEPENDENT
HYGIENE/GROOMING: HANDWASHING;INDEPENDENT
DRESS: INDEPENDENT

## 2021-09-10 NOTE — PLAN OF CARE
Problem: Substance Withdrawal  Goal: Substance Withdrawal  Description: Signs and symptoms of listed problems will be absent or manageable.  Outcome: Improving     Problem: Substance Withdrawal  Goal: Social and Therapeutic (Substance Withdrawal)  Description: Signs and symptoms of listed problems will be absent or manageable.  Outcome: Improving      Patient has been visible on the unit, reports she slept well but still feels tired and she ate 100% of breakfast. MSSA 8 & 4 requiring valum 10 mg X 1 this shift.  Reports anxiety 8/10 & depression 6/10 without thoughts plan or intent for self harm.  Patient verbally contracts for safety on the unit.  Patient is tolerating medications well, denies any current side effects. Patient discharge plans pending and denies any unmet needs at this time.

## 2021-09-10 NOTE — PLAN OF CARE
Pt monitored for alcohol withdrawal, MSSA of 8, 8. Pt received 10 mg valium x2    Pt remained isolative to room. States she ate dinner this evening. Denied SI

## 2021-09-10 NOTE — PLAN OF CARE
Problem: Adult Inpatient Plan of Care  Goal: Optimal Comfort and Wellbeing  Outcome: No Change     Behavioral  Pt appeared sleeping comfortably overnight; breathing was even and unlabored.      Medical  Pt continues in alcohol withdrawal; MSSA 9 and 8; 10 x 2 of valium given this shift;      No new medical concerns noted.

## 2021-09-10 NOTE — PROGRESS NOTES
Brief Medicine Note    Morning LFTs reviewed - stable to very slightly down trending. Recommend alcohol cessation and repeat LFTs as outpatient in ~ 7-10 days. IM will sign off, please call if new concerns.     July Fair PA-C  Hospitalist Service  Contact information available via [facility] Paging/Directory

## 2021-09-10 NOTE — PROGRESS NOTES
"[facility], [place]   Psychiatric Progress Note        Interim history   This is a 30 year old female withAlcohol use disorder severe  Alcohol withdrawal severe  Marijuana use disorder severe  Nicotine use disorder moderate  Cocaine use disorder moderate  PTSD chronic  Anxiety NOS rule out generalized anxiety disorder  Trichotillomania.Pt seen in rounds.   The patient's care was discussed with the treatment team during the daily team meeting and/or staff's chart notes were reviewed.  Staff report patient has been visible in the milieu,  no acute eventsovernight.     Patient's mood is ok  Energy Level:LOW  Sleep:No concerns, sleeps well through night  Appetite:fair motivation interest   Suicidal/homicidal ideation/plan intent.psychosis  No prior suicde attempts  No access to gun  Pt is in alcohol withdrawal still being monitered every 4 hrs for it,   Pt mssa score are monitered  Tolerating meds and has no side effects.              Medications:     Current Facility-Administered Medications   Medication     alum & mag hydroxide-simethicone (MAALOX) suspension 30 mL     atenolol (TENORMIN) tablet 50 mg     diazepam (VALIUM) tablet 5-20 mg     folic acid (FOLVITE) tablet 1 mg     hydrOXYzine (ATARAX) tablet 25 mg     mirtazapine (REMERON) tablet 15 mg     multivitamin w/minerals (THERA-VIT-M) tablet 1 tablet     nicotine (NICODERM CQ) 14 MG/24HR 24 hr patch 1 patch     nicotine Patch in Place     prazosin (MINIPRESS) capsule 1 mg     senna-docusate (SENOKOT-S/PERICOLACE) 8.6-50 MG per tablet 1 tablet     thiamine (B-1) tablet 100 mg     traZODone (DESYREL) tablet 50 mg             Allergies:     Allergies   Allergen Reactions     Morphine Rash            Psychiatric Examination:   Blood pressure (!) 119/91, pulse (!) 125, temperature 97.4  F (36.3  C), temperature source Temporal, resp. rate 14, height 1.6 m (5' 3\"), weight 68.1 kg (150 lb 3.2 oz), last menstrual period 09/03/2021, SpO2 97 " %, not currently breastfeeding.  Weight is 150 lbs 3.2 oz  Body mass index is 26.61 kg/m .    Appearance:  awake, alert and adequately groomed  Attitude:  cooperative  Eye Contact:  good  Mood:  better  Affect:  appropriate and in normal range and mood congruent  Speech:  clear, coherent rate /rhythm are good  Psychomotor Behavior:  no evidence of tardive dyskinesia, dystonia, or tics and intact station, gait and muscle tone  Throught Process:  logical  Associations:  no loose associations  Thought Content:  no evidence of suicidal ideation or homicidal ideation, no evidence of psychotic thought, no auditory hallucinations present and no visual hallucinations present  Insight:  fair  Judgement:  intact  Oriented to:  time, person, and place  Attention Span and Concentration:  intact  Recent and Remote Memory:  intact  Language fund of knowledge are adequate         Labs:     No results found for: NTBNPI, NTBNP  Lab Results   Component Value Date    WBC 6.6 09/08/2021    HGB 13.7 09/08/2021    HCT 41.4 09/08/2021    MCV 96 09/08/2021     09/08/2021     Lab Results   Component Value Date    TSH 1.23 09/10/2021         DX Alcohol use disorder severe  Alcohol withdrawal severe  Marijuana use disorder severe  Nicotine use disorder moderate  Cocaine use disorder moderate  PTSD chronic  Anxiety NOS rule out generalized anxiety disorder  Trichotillomania     PLAN   Alcohol intoxication/withdrawal, presently is on MSSA protocol with Valium. Continue the same MSSA protocol as ordered. Continue thiamine 100 mg p.o. daily, M.V.I. one p.o. daily and folate 1 mg p.o. Daily  Will continue mssa protocal to detox off alcohol on valium,  Pt is c/o of termor , agitation poor sleep and poor appetite, he has sweats, feels shakey  On mssa client scored scored8 today and  needed needed  10mg po as of yet , total dose since admission was 85mg    MSSA    Eating Disturbances: ate and enjoyed all of it or not applicable  Tremor: 1 -  not visibly apparent but can be felt by the examiner placing his fingertip slightly against the patient's fingertips  Sleep Disturbance: slept through the night or not applicable  Clouding of Sensorium: no evidence  Hallucinations: 0 - none  Quality of Contact: 0 - awareness of examiner and people around him/her  Agitation: 0 - normal activity  Paroxysmal Sweats: 0 - no observed sweating  Temperature: 99.5 or below  Pulse: 6 - 120 to 129  Total MSSA Score: 8  Continue present meds   Laboratory/Imaging: reviewed with patient   Consults: internal medicine consult reviewed  Patient will be treated in therapeutic milieu with appropriate individual and group therapies as described.  PDMP CHECKED     Supportive psychotheraoy provided, eric talked about recovery enviroment, relapse prevention, triggers to use.  Discussed with patient many issues of addiction,triggers, relapse, and establishing a solid recovery program.  Asked pt to be med complinat   Medical diagnoses to be addressed this admission:    Plan:    ASSESSMENT & PLAN:         Patricia Alberto is a 30 year old female with PMH of ADHD and alcohol use disorder who was admitted for detox.      # Alcohol dependence:  Longstanding history of alcohol abuse. Escalating use recently d/t social stressors. Currently reporting mild symptoms of withdrawal. No history of withdrawal seizures or DT's. LFT's abnormal (see below). No other complications noted.  - Management per primary team     # Transaminitis:  Hepatocellular pattern. Bilirubin and Alk Phos normal. No risk factors for viral hepatitis. Completed Hep B vaccine series in 2006 per records. Suspect 2/2 alcohol use despite AST:ALT not 2:1. DDx includes resolving alcoholic hepatitis, alcoholic steatohepatitis, NAFLD, and less likely viral.   - Repeat LFTs in AM            The patient's care was discussed with the Primary team.    Legal Status: voluntary    Safety Assessment:   Checks:  15 min  Precautions: withdrawal  precautions  Pt has not required locked seclusion or restraints in the past 24 hours to maintain safety, please refer to RN documentation for further details.  Discussed with patient many issues of addiction,triggers, relapse, and establishing a solid recovery program.  Able to give informed consent:  YES   Discussed Risks/Benefits/Side Effects/Alternatives: YES    After discussion of the indications, risks, benefits, alternatives and consequences of no treatment, the patient elects to complete detox and do trt.

## 2021-09-10 NOTE — PLAN OF CARE
Number of patients attending the group:  8  Group Length:  1 Hours    Group Therapy     Summary of Group / Topics Discussed:      The  Psychotherapy group goal is to promote insight to positive choice and change. Group processing is within a supportive and safe environment. Patients will process emotions using verbal group and expressive psychotherapy interventions.        Assessment Discussed Autobiography in Five short chapters as well as stages of change. Group discussed this in relationship to their own lives and addiction; some group members also discussed comorbid issues with mental health. Group also discussed concept of responsibility and what it takes to motivate towards change. Discussed how the familiarity of the 'hole' can be more comfortable than the unknown.            Patient Response- Pt reported this was her first time seeking help, that she's tried to sober up on her own in the past. Pt shared her struggle and journey.

## 2021-09-11 PROCEDURE — 250N000013 HC RX MED GY IP 250 OP 250 PS 637: Performed by: PSYCHIATRY & NEUROLOGY

## 2021-09-11 PROCEDURE — 128N000004 HC R&B CD ADULT

## 2021-09-11 PROCEDURE — H2032 ACTIVITY THERAPY, PER 15 MIN: HCPCS

## 2021-09-11 RX ORDER — IBUPROFEN 600 MG/1
600 TABLET, FILM COATED ORAL EVERY 6 HOURS PRN
Status: DISCONTINUED | OUTPATIENT
Start: 2021-09-11 | End: 2021-09-13 | Stop reason: HOSPADM

## 2021-09-11 RX ADMIN — NICOTINE 1 PATCH: 14 PATCH, EXTENDED RELEASE TRANSDERMAL at 18:08

## 2021-09-11 RX ADMIN — PRAZOSIN HYDROCHLORIDE 1 MG: 1 CAPSULE ORAL at 20:18

## 2021-09-11 RX ADMIN — FOLIC ACID 1 MG: 1 TABLET ORAL at 08:57

## 2021-09-11 RX ADMIN — IBUPROFEN 600 MG: 600 TABLET ORAL at 21:10

## 2021-09-11 RX ADMIN — IBUPROFEN 600 MG: 600 TABLET ORAL at 09:38

## 2021-09-11 RX ADMIN — MIRTAZAPINE 15 MG: 15 TABLET, FILM COATED ORAL at 20:18

## 2021-09-11 RX ADMIN — THIAMINE HCL TAB 100 MG 100 MG: 100 TAB at 08:57

## 2021-09-11 RX ADMIN — MULTIPLE VITAMINS W/ MINERALS TAB 1 TABLET: TAB at 08:57

## 2021-09-11 RX ADMIN — NICOTINE 1 PATCH: 14 PATCH, EXTENDED RELEASE TRANSDERMAL at 08:55

## 2021-09-11 ASSESSMENT — ACTIVITIES OF DAILY LIVING (ADL)
ORAL_HYGIENE: INDEPENDENT
LAUNDRY: WITH SUPERVISION
DRESS: INDEPENDENT
HYGIENE/GROOMING: INDEPENDENT

## 2021-09-11 NOTE — PLAN OF CARE
"Pt is monitored for alcohol withdrawal, MSSA of 4, 5. Pt did not receive valium for withdrawal symptoms this shift.     Pt states her appetite was \"good\" at dinner. Pt was visible in milieu and attended evening AA meeting. Denied SI  "

## 2021-09-11 NOTE — PLAN OF CARE
"  Problem: General Rehab Plan of Care  Goal: Therapeutic Recreation/Music Therapy Goal  Description: The patient and/or their representative will achieve their patient-specific goals related to the plan of care.  The patient-specific goals include: positive social engagement   Outcome: No Change     Ora reported feeling \"good.\" Patient attended art therapy group for 1 hour (12 patients total). Patient participated in the art activity to make art as a group, passing artwork around the room so everyone can contribute to each piece. Patient interacted appropriately with peers, sharing supplies and engaging in friendly conversation. Peers connected in socially positive and encouraging ways, complimenting other's and thanking other's for their contributions to their work.   "

## 2021-09-11 NOTE — PLAN OF CARE
"Out of detox:  Patricia last received valium per [facility] protocol at 08:18 am on 9/10/21, which is greater than 24 hours. Pt is out of detox.     Assessment:  Patricia was up ad teressa, spent most of the shift in the milieu. Pt attended programming and was selectively social with peers. Patricia denies having suicidal ideation or self harm thoughts. Pt reports she is feeling well physically and is more calm and hopeful.     Pain: Pt complained of \"Sciatica pain = 10/10. PRN Ibuprofen was given which Patricia said was effective in decreasing her pain to 1/10.  "

## 2021-09-11 NOTE — PLAN OF CARE
Problem: Substance Withdrawal  Goal: Substance Withdrawal  Description: Signs and symptoms of listed problems will be absent or manageable.  Outcome: Improving     Behavioral  Pt appeared sleeping comfortably overnight; breathing was even and unlabored.      Medical  Pt continues in alcohol withdrawal; MSSA  5; no valium given this shift; Pt last valium 9/10/21 @ 0818     No new medical concerns noted.

## 2021-09-12 PROCEDURE — 250N000013 HC RX MED GY IP 250 OP 250 PS 637: Performed by: PSYCHIATRY & NEUROLOGY

## 2021-09-12 PROCEDURE — 128N000004 HC R&B CD ADULT

## 2021-09-12 PROCEDURE — H0001 ALCOHOL AND/OR DRUG ASSESS: HCPCS | Performed by: COUNSELOR

## 2021-09-12 RX ADMIN — NICOTINE POLACRILEX 2 MG: 2 GUM, CHEWING BUCCAL at 21:54

## 2021-09-12 RX ADMIN — IBUPROFEN 600 MG: 600 TABLET ORAL at 21:54

## 2021-09-12 RX ADMIN — IBUPROFEN 600 MG: 600 TABLET ORAL at 12:36

## 2021-09-12 RX ADMIN — PRAZOSIN HYDROCHLORIDE 1 MG: 1 CAPSULE ORAL at 21:54

## 2021-09-12 RX ADMIN — NICOTINE 1 PATCH: 14 PATCH, EXTENDED RELEASE TRANSDERMAL at 08:18

## 2021-09-12 RX ADMIN — MIRTAZAPINE 15 MG: 15 TABLET, FILM COATED ORAL at 21:54

## 2021-09-12 RX ADMIN — THIAMINE HCL TAB 100 MG 100 MG: 100 TAB at 08:18

## 2021-09-12 RX ADMIN — MULTIPLE VITAMINS W/ MINERALS TAB 1 TABLET: TAB at 08:18

## 2021-09-12 RX ADMIN — NICOTINE POLACRILEX 2 MG: 2 GUM, CHEWING BUCCAL at 20:11

## 2021-09-12 RX ADMIN — FOLIC ACID 1 MG: 1 TABLET ORAL at 08:18

## 2021-09-12 ASSESSMENT — ACTIVITIES OF DAILY LIVING (ADL)
ORAL_HYGIENE: INDEPENDENT
HYGIENE/GROOMING: INDEPENDENT
DRESS: INDEPENDENT
ORAL_HYGIENE: INDEPENDENT
DRESS: STREET CLOTHES;INDEPENDENT
LAUNDRY: WITH SUPERVISION
HYGIENE/GROOMING: HANDWASHING;INDEPENDENT

## 2021-09-12 NOTE — PLAN OF CARE
Pt has completed detox.     Pt attended afternoon therapy group and was social with peers in the milieu. Denied SI    Pt will likely meet with weekend Case Management to  work on treatment plans for after discharge.

## 2021-09-12 NOTE — PLAN OF CARE
Problem: Adult Inpatient Plan of Care  Goal: Optimal Comfort and Wellbeing  Outcome: No Change     Behavioral  Pt appeared sleeping comfortably overnight; breathing was even and unlabored.      Medical  Pt out of detox from alcohol.   No new medical concerns noted.

## 2021-09-12 NOTE — PROGRESS NOTES
Writer completed CD assessment with patient, would like to send referrals to [facility], [facility] and [facility]. Currently waiting on confirmation from financial office that her MA is active.     Monday CM-Please fax referrals as soon as MA is confirmed on Monday morning. All referral paperwork is in order. Patient has now been here for 5 days, is out of detox, and would like to be able to go home to pack prior to admission to tx. Writer left patient know that she would need to speak with the provider on tomorrow about discharging, or if door to door is required. Patient has no previous detox or treatment episodes, first time trying to get sober.     Patient plans on working with residential treatment program to find a PCP depending on where she lives after graduation.

## 2021-09-13 VITALS
HEART RATE: 96 BPM | TEMPERATURE: 97.9 F | OXYGEN SATURATION: 99 % | SYSTOLIC BLOOD PRESSURE: 113 MMHG | HEIGHT: 63 IN | RESPIRATION RATE: 16 BRPM | WEIGHT: 150.2 LBS | DIASTOLIC BLOOD PRESSURE: 79 MMHG | BODY MASS INDEX: 26.61 KG/M2

## 2021-09-13 PROCEDURE — 99239 HOSP IP/OBS DSCHRG MGMT >30: CPT | Performed by: PSYCHIATRY & NEUROLOGY

## 2021-09-13 PROCEDURE — 250N000013 HC RX MED GY IP 250 OP 250 PS 637: Performed by: PSYCHIATRY & NEUROLOGY

## 2021-09-13 RX ORDER — HYDROXYZINE HYDROCHLORIDE 25 MG/1
25 TABLET, FILM COATED ORAL EVERY 4 HOURS PRN
Qty: 30 TABLET | Refills: 0 | Status: SHIPPED | OUTPATIENT
Start: 2021-09-13

## 2021-09-13 RX ORDER — NICOTINE 21 MG/24HR
1 PATCH, TRANSDERMAL 24 HOURS TRANSDERMAL DAILY
Qty: 30 PATCH | Refills: 1 | Status: SHIPPED | OUTPATIENT
Start: 2021-09-13 | End: 2022-02-08

## 2021-09-13 RX ORDER — FOLIC ACID 1 MG/1
1 TABLET ORAL DAILY
Qty: 30 TABLET | Refills: 0 | Status: SHIPPED | OUTPATIENT
Start: 2021-09-13

## 2021-09-13 RX ORDER — LANOLIN ALCOHOL/MO/W.PET/CERES
100 CREAM (GRAM) TOPICAL DAILY
Qty: 30 TABLET | Refills: 0 | Status: SHIPPED | OUTPATIENT
Start: 2021-09-13

## 2021-09-13 RX ORDER — MIRTAZAPINE 15 MG/1
15 TABLET, FILM COATED ORAL AT BEDTIME
Qty: 30 TABLET | Refills: 0 | Status: SHIPPED | OUTPATIENT
Start: 2021-09-13 | End: 2022-02-08

## 2021-09-13 RX ORDER — MULTIPLE VITAMINS W/ MINERALS TAB 9MG-400MCG
1 TAB ORAL DAILY
Qty: 30 TABLET | Refills: 0 | Status: SHIPPED | OUTPATIENT
Start: 2021-09-13

## 2021-09-13 RX ORDER — PRAZOSIN HYDROCHLORIDE 1 MG/1
1 CAPSULE ORAL AT BEDTIME
Qty: 30 CAPSULE | Refills: 0 | Status: SHIPPED | OUTPATIENT
Start: 2021-09-13

## 2021-09-13 RX ADMIN — NICOTINE POLACRILEX 2 MG: 2 GUM, CHEWING BUCCAL at 11:13

## 2021-09-13 RX ADMIN — NICOTINE POLACRILEX 2 MG: 2 GUM, CHEWING BUCCAL at 08:06

## 2021-09-13 RX ADMIN — NICOTINE 1 PATCH: 14 PATCH, EXTENDED RELEASE TRANSDERMAL at 08:05

## 2021-09-13 RX ADMIN — MULTIPLE VITAMINS W/ MINERALS TAB 1 TABLET: TAB at 08:05

## 2021-09-13 RX ADMIN — FOLIC ACID 1 MG: 1 TABLET ORAL at 08:05

## 2021-09-13 RX ADMIN — THIAMINE HCL TAB 100 MG 100 MG: 100 TAB at 08:05

## 2021-09-13 NOTE — DISCHARGE INSTRUCTIONS
Behavioral Discharge Planning and Instructions  THANK YOU FOR CHOOSING THE [facility]  3A  [phone]    Summary: You were admitted to [facility] on 9/9/21 for detoxification from Alcohol.  A medical exam was performed that included lab work. You have met with a  and opted to attend residential treatment.  Please take care and make your recovery a priority, Patricia!    Recommendation:  Enter and complete a residential treatment program such as [facility], [facility], [facility] and follow all continuing care recommendations.      Main Diagnosis: Per Dr. Edgar  Alcohol use disorder severe  Alcohol withdrawal severe    ***Major Treatments, Procedures and Findings:  You were detoxed from alcohol with the Modified Selective Severity Protocol using Valium. You have met with a  to develop a treatment plan for discharge.  You have had labs drawn and a copy of those labs will be sent home with you.  Please bring your lab results with to your follow up doctor appointment.    Symptoms to Report:  If you experience more anxiety, confusion, sleeplessness, deep sadness or thoughts of suicide, notify your treatment team or notify your primary care physician. IF ANY OF THE SYMPTOMS YOU ARE EXPERIENCING ARE A MEDICAL EMERGENCY CALL 911 IMMEDIATELY.     Lifestyle Adjustment: Adjust your lifestyle to get enough sleep, relaxation, exercise and  good nutrition. Continue to develop healthy coping skills to decrease stress and promote a sober living environment. Do not use alcohol, illegal drugs or addictive medications other than what is currently prescribed. AA, NA, and  Sponsor are excellent resources for support.     Facts about COVID19 at www.cdc.gov/COVID19 and www.[place].gov/covid19    Keeping hands clean is one of the most important steps we can take to avoid getting sick and spreading germs to others.  Please wash your hands frequently and lather with soap for at least  "20 seconds!    Follow-up Appointment:   [facility]      [address] , [place] [address] [phone]    Appointment Date/Time: 9/16/2021 at 10:00am    Treatment Follow-Up:  You have been referred to the following three programs. They will reach out to coordinate a start date once you have finished the review process. Keep your phone on and charged, and check your voicemails frequently. Call them if you do not hear anything within 1-2 days.   [facility]  Address: [address] [address]  Phone: [phone]  Fax: [phone]  About: \"[facility] is a residential program for women whose substance use is complicated by other mental health issues. Substance use treatment services help women break the cycle of addiction and work toward wellness and stability. This program is individualized to fit the person's needs, and may include high, medium or low intensity inpatient treatment services, outpatient, or recovery maintenance.  Wherever you are in your current recovery journey, [facility] is here to help.\"    [facility]  Address: [address] [place] [address]  Phone: [phone]  Fax: [phone]  About: [facility] offers full-service residential alcohol and drug recovery treatment for women and men of all levels of career/employment status, professional achievement, educational background and/or academic standing. Our residential treatment facility is located on the north [facility] and our program is designed for people who want to get away from outside influences to reflect and find serenity in a [place] setting without distraction.     [facility]  Address: [address] [address]  Phone: [phone]  Fax: [phone]  About the program: \"[facility] is a women s facility located in [place], [place]. Just an hour outside of the [place] " "[facility], [facility] offers a peaceful campus feel as it sits amongst the fields and forest. For women 18-years-old and older, [facility] is a place where women can come to realize their full potential. Staffed by professionals with the ability to treat both mental and chemical health needs, we customize treatment plans to help foster healthy living and coping skills for every person, ensuring each leaves with the skills to thrive in a new life of recovery.\"     Mental Health Association of [place] (www.mentalhealth.org): 132.654.9054 or 965-909-6691     -Substance Abuse and Mental Health Services (www.samhsa.gov)  -Harm Reduction Coalition (www. Harmreduction.org)  -www.prescribetoprevent.org or http://prescribetoprevent.org/video  -Poison control 0-980-491-8843   **[place] Opioid Prevention Coalition: www.opioidcoalition.org    Sober Support Group Information:  AA/NA & Sponsor/Support  -Alcoholics Anonymous (www.alcoholics-anonymous.org): for local information 24 hours/day  -AA Intergroup service office in [place] (http://www.aastpaul.org/) 101.399.7222  -AA Intergroup service office in [facility]: [phone]. (http://www.a[place].org/)  -Narcotics Anonymous (www.nam[place].org) (311) 995-5313   **Sober Fun Activities: www.soberPollVaultractivities.[employer].BloomThat/[facility]//[facility] Recovery Connection ([facility])  [facility] connects people seeking recovery to resources that help foster and sustain long-term recovery.  Whether you are seeking resources for treatment, transportation, housing, job training, education, health care or other pathways to recovery, [facility] is a great place to start.    Phone: 342.130.1588.  www.[employer].HistoPathway (Great listing of all types of recovery and non-recovery related resources)      General Medication Instructions:   See your medication sheet(s) for instructions.   Take all medicines as directed.  Make no changes unless your doctor suggests them.   Go to all your doctor " visits.  Be sure to have all your required lab tests. This way, your medicines can be refilled on time.  Do not use any drugs not prescribed by your provider.  AA/NA and Sponsors are excellent resources for support  Avoid alcohol.    Any follow up concerns:  Nursing questions call the [facility] [phone]  Medical Record call [phone]  Outpatient Behavioral Intake call [phone]  LP+ Wait List/Bed Availability call [phone]    The entire treatment team has appreciated the opportunity to work with you Patricia.  We wish you the best in the future and with your lifelong recovery goals. Please bring this discharge folder with you to all follow up appointments.  It contains your lab results, diagnosis, medication list and discharge recommendations.    THANK YOU FOR CHOOSING THE [facility]

## 2021-09-13 NOTE — PROVIDER NOTIFICATION
"Pt informed this RN that she \"felt like my heart is beating out of my chest.\"  /81, ; pt denied chest pain or SOB; provider notified  "

## 2021-09-13 NOTE — DISCHARGE SUMMARY
Patricia Alberto MRN# [id number]   Age: 30 year old YOB: 1990     Date of Admission:  9/8/2021  Date of Discharge:  9/13/2021  Admitting Physician:  Constance Edgar MD  Discharge Physician:  Constance Edgar MD      DISCHARGE  DX  Alcohol use disorder severe    Marijuana use disorder severe  Nicotine use disorder moderate  Cocaine use disorder moderate  PTSD chronic  Anxiety NOS rule out generalized anxiety disorder  Trichotillomania           Event Leading to Hospitalization:     See Admission note by admitting provider for patient encounter. for additional details.          Hospital Course:   PATIENT was admitted to [facility] attending  under DR edgar, please review the detailed admit note on 9/9/21   The patient was placed under status 15 (15 minute checks) to ensure patient safety.   MSSA protocol was initiated due to the patient's history of alcohol abuse and concern for withdrawal symptoms.  CBC, BMP and utox obtained.    All outpatient medications were continued    PATIENTdid participate in groups and was visible in the milieu.     The patient's symptoms of alcohol withdrawal  improved.     Patients energy motivation , sleep appetite improved.  Pt completed detox . It was un eventful.      Discussed with patient medications for craving.  Spoke with patient about triggers coping skills relapse prevention.    CONSULTS DONE DURING PATIENTS HOSPITALIZATION.  Patient was seen by medicine on date9/9/21    This as per their medical consult    ASSESSMENT & PLAN:         Patricia Alberto is a 30 year old female with PMH of ADHD and alcohol use disorder who was admitted for detox.      # Alcohol dependence:  Longstanding history of alcohol abuse. Escalating use recently d/t social stressors. Currently reporting mild symptoms of withdrawal. No history of withdrawal seizures or DT's. LFT's abnormal (see below). No other complications noted.  - Management per primary team     # Transaminitis:   Hepatocellular pattern. Bilirubin and Alk Phos normal. No risk factors for viral hepatitis. Completed Hep B vaccine series in 2006 per records. Suspect 2/2 alcohol use despite AST:ALT not 2:1. DDx includes resolving alcoholic hepatitis, alcoholic steatohepatitis, NAFLD, and less likely viral.   - Repeat LFTs in AM            The patient's care was discussed with the Primary team.             Pt was seen by cm  As per recommendations from cm    Writer completed CD assessment with patient, would like to send referrals to [facility], [facility] and [facility]. Currently waiting on confirmation from financial office that her MA is active.      Monday CM-Please fax referrals as soon as MA is confirmed on Monday morning. All referral paperwork is in order. Patient has now been here for 5 days, is out of detox, and would like to be able to go home to pack prior to admission to tx. Writer left patient know that she would need to speak with the provider on tomorrow about discharging, or if door to door is required. Patient has no previous detox or treatment episodes, first time trying to get sober.      Patient plans on working with residential treatment program to find a PCP depending on where she lives after graduation.          Labs:reviewed with patient     No results found for this or any previous visit (from the past 48 hour(s)).      Recent Results (from the past 240 hour(s))   Alcohol breath test POCT    Collection Time: 09/08/21  3:45 PM   Result Value Ref Range    Alcohol Breath Test 0.13 (A) 0.00 - 0.01   Comprehensive metabolic panel    Collection Time: 09/08/21  5:33 PM   Result Value Ref Range    Sodium 140 133 - 144 mmol/L    Potassium 3.8 3.4 - 5.3 mmol/L    Chloride 107 94 - 109 mmol/L    Carbon Dioxide (CO2) 27 20 - 32 mmol/L    Anion Gap 6 3 - 14 mmol/L    Urea Nitrogen 9 7 - 30 mg/dL    Creatinine 0.58 0.52 - 1.04 mg/dL    Calcium 8.7 8.5 - 10.1 mg/dL    Glucose 113 (H) 70 - 99 mg/dL    Alkaline  Phosphatase 90 40 - 150 U/L     (H) 0 - 45 U/L     (H) 0 - 50 U/L    Protein Total 7.6 6.8 - 8.8 g/dL    Albumin 4.0 3.4 - 5.0 g/dL    Bilirubin Total 0.4 0.2 - 1.3 mg/dL    GFR Estimate >90 >60 mL/min/1.73m2   Asymptomatic COVID-19 Virus (Coronavirus) by PCR Nasopharyngeal    Collection Time: 09/08/21  5:33 PM    Specimen: Nasopharyngeal; Swab   Result Value Ref Range    SARS CoV2 PCR Negative Negative   CBC with platelets and differential    Collection Time: 09/08/21  5:33 PM   Result Value Ref Range    WBC Count 6.6 4.0 - 11.0 10e3/uL    RBC Count 4.30 3.80 - 5.20 10e6/uL    Hemoglobin 13.7 11.7 - 15.7 g/dL    Hematocrit 41.4 35.0 - 47.0 %    MCV 96 78 - 100 fL    MCH 31.9 26.5 - 33.0 pg    MCHC 33.1 31.5 - 36.5 g/dL    RDW 13.9 10.0 - 15.0 %    Platelet Count 274 150 - 450 10e3/uL    % Neutrophils 72 %    % Lymphocytes 20 %    % Monocytes 6 %    % Eosinophils 1 %    % Basophils 1 %    % Immature Granulocytes 0 %    NRBCs per 100 WBC 0 <1 /100    Absolute Neutrophils 4.8 1.6 - 8.3 10e3/uL    Absolute Lymphocytes 1.3 0.8 - 5.3 10e3/uL    Absolute Monocytes 0.4 0.0 - 1.3 10e3/uL    Absolute Eosinophils 0.0 0.0 - 0.7 10e3/uL    Absolute Basophils 0.1 0.0 - 0.2 10e3/uL    Absolute Immature Granulocytes 0.0 <=0.0 10e3/uL    Absolute NRBCs 0.0 10e3/uL   HCG qualitative urine (UPT)    Collection Time: 09/08/21  5:42 PM   Result Value Ref Range    hCG Urine Qualitative Negative Negative   Drug abuse screen 1 urine (ED)    Collection Time: 09/08/21  5:42 PM   Result Value Ref Range    Amphetamines Urine Screen Negative Screen Negative    Barbiturates Urine Screen Negative Screen Negative    Benzodiazepines Urine Screen Negative Screen Negative    Cannabinoids Urine Screen Positive (A) Screen Negative    Cocaine Urine Screen Positive (A) Screen Negative    Opiates Urine Screen Negative Screen Negative   GGT    Collection Time: 09/10/21  7:31 AM   Result Value Ref Range     (H) 0 - 40 U/L   Lipid panel     Collection Time: 09/10/21  7:31 AM   Result Value Ref Range    Cholesterol 205 (H) <200 mg/dL    Triglycerides 50 <150 mg/dL    Direct Measure  >=50 mg/dL    LDL Cholesterol Calculated 65 <=100 mg/dL    Non HDL Cholesterol 75 <130 mg/dL   TSH with free T4 reflex and/or T3 as indicated    Collection Time: 09/10/21  7:31 AM   Result Value Ref Range    TSH 1.23 0.40 - 4.00 mU/L   Vitamin B12    Collection Time: 09/10/21  7:31 AM   Result Value Ref Range    Vitamin B12 729 193 - 986 pg/mL   Folate    Collection Time: 09/10/21  7:31 AM   Result Value Ref Range    Folic Acid 10.6 >=5.4 ng/mL   Hepatic panel    Collection Time: 09/10/21  7:31 AM   Result Value Ref Range    Bilirubin Total 0.7 0.2 - 1.3 mg/dL    Bilirubin Direct 0.2 0.0 - 0.2 mg/dL    Protein Total 7.1 6.8 - 8.8 g/dL    Albumin 3.5 3.4 - 5.0 g/dL    Alkaline Phosphatase 89 40 - 150 U/L     (H) 0 - 45 U/L     (H) 0 - 50 U/L            Because this patient meets criteria for an Alcohol Use Disorder, I performed the following brief intervention on the date of this note:              1) Expressed concern that the patient is drinking at unhealthy levels known to increase their risk of alcohol related problems              2) Gave feedback linking alcohol use and health, including personalized feedback explaining how alcohol use can interact with their medical and/or psychiatric problems, and with prescribed medications.              3) Advised patient to abstain.    PT counseled on nicotine cessation and nicotine replacement provided    Discussed with patient many issues of addiction,triggers, relapse, and establishing a solid recovery program.    DISCHARGE MENTAL STATUS EXAMINATION:  The patient is alert, oriented x3.  Good fund of knowledge.  Good use of language.  Recent and remote memory, language, fund of knowledge are all adequate.  Euthymic mood congruent affect  Speech normal rate/rhythm linear tp no loose asso,The patient does  not have any active suicidal or homicidal ideation.  Does not have any auditory or visual hallucination.  Fair insight/judgment At this time, the patient was stable to be discharged.        Pt was not determined to not be a danger to himself or others. At the current time of discharge, the patient does not meet criteria for involuntary hospitalization. On the day of discharge, the patient reports that they do not have suicidal or homicidal ideation and would never hurt themselves or others. Steps taken to minimize risk include: assessing patient s behavior and thought process daily during hospital stay, discharging patient with adequate plan for follow up for mental and physical health and discussing safety plan of returning to the hospital should the patient ever have thoughts of harming themselves or others. Therefore, based on all available evidence including the factors cited above, the patient does not appear to be at imminent risk for self-harm, and is appropriate for outpatient level of care.     Educated about side effects/risk vs benefits /alternative including non treatment.Pt consented to be on medication.     .Total time spent on discharge summary more than 35 min  More than  20 min  planning, coordination of care, medication reconciliation and performance of physical exam on day of discharge.Care was coordinated with unit RN and unit therapist     Patrciia Alberto   Home Medication Instructions ROMA:[id number]    Printed on:09/13/21 1212   Medication Information                      folic acid (FOLVITE) 1 MG tablet  Take 1 tablet (1 mg) by mouth daily             hydrOXYzine (ATARAX) 25 MG tablet  Take 1 tablet (25 mg) by mouth every 4 hours as needed for anxiety             mirtazapine (REMERON) 15 MG tablet  Take 1 tablet (15 mg) by mouth At Bedtime             multivitamin w/minerals (THERA-VIT-M) tablet  Take 1 tablet by mouth daily             nicotine (NICODERM CQ) 14 MG/24HR 24 hr patch  Place 1  "patch onto the skin daily             nicotine (NICORETTE) 2 MG gum  Place 1 each (2 mg) inside cheek every hour as needed for smoking cessation             prazosin (MINIPRESS) 1 MG capsule  Take 1 capsule (1 mg) by mouth At Bedtime             thiamine (B-1) 100 MG tablet  Take 1 tablet (100 mg) by mouth daily             Disposition: home     Facts about COVID19 at www.cdc.gov/COVID19 and www.[place].gov/covid19     Keeping hands clean is one of the most important steps we can take to avoid getting sick and spreading germs to others.  Please wash your hands frequently and lather with soap for at least 20 seconds!     Medical Follow-Up:Writer completed CD assessment with patient, would like to send referrals to [facility], [facility] and [facility]. Currently waiting on confirmation from financial office that her MA is active.      Monday CM-Please fax referrals as soon as MA is confirmed on Monday morning. All referral paperwork is in order. Patient has now been here for 5 days, is out of detox, and would like to be able to go home to pack prior to admission to tx. Writer left patient know that she would need to speak with the provider on tomorrow about discharging, or if door to door is required. Patient has no previous detox or treatment episodes, first time trying to get sober.      Patient plans on working with residential treatment program to find a PCP depending on where she lives after graduation.          .        \"Much or all of the text in this note was generated through the use of Dragon Dictate voice to text software. Errors in spelling or words which appear to be out of contact are unintentional, may be present due having escaped editing\"     "

## 2021-09-13 NOTE — PROGRESS NOTES
Patient discharged to home. Reports being picked up by a friend. Patient escorted off the unit by psych associate, Sammi.        All patient belongings from room, locked bin and security were sent with patient. This RN went over discharge instructions, teachings, patient's labs, and discharge  recommendations with patient. This RN went over patient's prescribed medications being sent home with patient from pharmacy. Patient verbalizes and demonstrates understanding of all teachings. Patient denies thoughts of harm towards self or others.  Pt denies any current medical issues at this time. Patient denies any further questions and is now discharged at this time

## 2021-09-13 NOTE — PLAN OF CARE
"Patient visible in the milieu, social with peers. Patient affect full range, mood is anxious. Patient denies SI, SIB, HI, or hallucinations. Patient continues to be \"out of detox\" for alcohol withdrawal. Patient VSS, except BP mildly elevated, appetite good. Patient did request to have nicotine gum, for breakthrough cravings. On-call physician notified and 2 mg nicotine gum ordered. Patient was given prn ibuprofen for back pain, rating a 2/10. Patient denies any additional concerns. BP (!) 133/91 (BP Location: Right arm)   Pulse 81   Temp 97.8  F (36.6  C) (Temporal)   Resp 16   Ht 1.6 m (5' 3\")   Wt 68.1 kg (150 lb 3.2 oz)   LMP 09/03/2021 (Exact Date)   SpO2 97%   Breastfeeding No   BMI 26.61 kg/m     "

## 2021-10-03 ENCOUNTER — HEALTH MAINTENANCE LETTER (OUTPATIENT)
Age: 31
End: 2021-10-03

## 2022-02-08 ENCOUNTER — OFFICE VISIT (OUTPATIENT)
Dept: INTERNAL MEDICINE | Facility: CLINIC | Age: 32
End: 2022-02-08
Payer: MEDICAID

## 2022-02-08 VITALS
SYSTOLIC BLOOD PRESSURE: 110 MMHG | HEIGHT: 63 IN | WEIGHT: 166 LBS | OXYGEN SATURATION: 98 % | BODY MASS INDEX: 29.41 KG/M2 | DIASTOLIC BLOOD PRESSURE: 60 MMHG | HEART RATE: 82 BPM

## 2022-02-08 DIAGNOSIS — R82.90 BAD ODOR OF URINE: Primary | ICD-10-CM

## 2022-02-08 LAB
ALBUMIN UR-MCNC: NEGATIVE MG/DL
AMORPH CRY #/AREA URNS HPF: ABNORMAL /HPF
APPEARANCE UR: ABNORMAL
BACTERIA #/AREA URNS HPF: ABNORMAL /HPF
BILIRUB UR QL STRIP: NEGATIVE
CLUE CELLS: PRESENT
COLOR UR AUTO: YELLOW
GLUCOSE UR STRIP-MCNC: NEGATIVE MG/DL
HGB UR QL STRIP: NEGATIVE
KETONES UR STRIP-MCNC: NEGATIVE MG/DL
LEUKOCYTE ESTERASE UR QL STRIP: NEGATIVE
NITRATE UR QL: NEGATIVE
PH UR STRIP: 7 [PH] (ref 5–8)
RBC #/AREA URNS AUTO: ABNORMAL /HPF
SP GR UR STRIP: 1.02 (ref 1–1.03)
SQUAMOUS #/AREA URNS AUTO: ABNORMAL /LPF
TRICHOMONAS, WET PREP: ABNORMAL
UROBILINOGEN UR STRIP-ACNC: 0.2 E.U./DL
WBC #/AREA URNS AUTO: ABNORMAL /HPF
WBC'S/HIGH POWER FIELD, WET PREP: ABNORMAL
YEAST, WET PREP: ABNORMAL

## 2022-02-08 PROCEDURE — 81001 URINALYSIS AUTO W/SCOPE: CPT | Performed by: INTERNAL MEDICINE

## 2022-02-08 PROCEDURE — 87591 N.GONORRHOEAE DNA AMP PROB: CPT | Performed by: INTERNAL MEDICINE

## 2022-02-08 PROCEDURE — 87210 SMEAR WET MOUNT SALINE/INK: CPT | Performed by: INTERNAL MEDICINE

## 2022-02-08 PROCEDURE — 99203 OFFICE O/P NEW LOW 30 MIN: CPT | Performed by: INTERNAL MEDICINE

## 2022-02-08 RX ORDER — METRONIDAZOLE 500 MG/1
500 TABLET ORAL 2 TIMES DAILY
Qty: 14 TABLET | Refills: 0 | Status: SHIPPED | OUTPATIENT
Start: 2022-02-08 | End: 2022-02-15

## 2022-02-08 ASSESSMENT — MIFFLIN-ST. JEOR: SCORE: 1437.1

## 2022-02-08 NOTE — PROGRESS NOTES
"  Assessment & Plan   Problem List Items Addressed This Visit     None      Visit Diagnoses     Bad odor of urine    -  Primary    Relevant Orders    UA with Microscopic reflex to Culture - Clinic Collect (Completed)    UA Macro with Reflex to Micro and Culture - lab collect    Wet prep - Clinic Collect (Completed)    Neisseria gonorrhoeae PCR - Clinic Collect    Chlamydia trachomatis PCR    Urine Microscopic (Completed)           Whitish vaginal discharge consistent with bacterial vaginosis.  We will check a wet prep along with a urinalysis today.  I also sent off a GC and chlamydia at her request as well.  I will contact her with results and further recommendations.        No follow-ups on file.    Adan Mars MD  [facility]    Brad Gomez is a 31-year-old female who comes in today as an add-on patient for concerns of vaginal discharge.  She states that this is been going on for about the last 3 to 4weeks.  She states that it \"smells funky\" in her vaginal area she has been having some whitish discharge as well.  No pain with urination or blood in urine.  No increased urinary frequency.  No abdominal pain or fevers.  She is in a sexual relationship with 1 male at this time and does not know if she has been exposed to any STDs recently.  She is asking for STD testing today.      Objective    /60 (BP Location: Right arm, Patient Position: Sitting, Cuff Size: Adult Regular)   Pulse 82   Ht 1.6 m (5' 3\")   Wt 75.3 kg (166 lb)   SpO2 98%   BMI 29.41 kg/m    Body mass index is 29.41 kg/m .  Physical Exam     Abdomen is soft, nontender, nondistended.    Pelvic: Speculum exam reveals whitish discharge in the vaginal vault.  Cervix nonfriable.        "

## 2022-02-10 LAB — N GONORRHOEA DNA SPEC QL NAA+PROBE: NEGATIVE

## 2022-09-04 ENCOUNTER — HEALTH MAINTENANCE LETTER (OUTPATIENT)
Age: 32
End: 2022-09-04

## 2023-01-15 ENCOUNTER — HEALTH MAINTENANCE LETTER (OUTPATIENT)
Age: 33
End: 2023-01-15

## 2024-02-17 ENCOUNTER — HEALTH MAINTENANCE LETTER (OUTPATIENT)
Age: 34
End: 2024-02-17

## 2024-06-02 ENCOUNTER — HOSPITAL ENCOUNTER (EMERGENCY)
Facility: CLINIC | Age: 34
Discharge: HOME | End: 2024-06-03
Admitting: PHYSICIAN ASSISTANT
Payer: COMMERCIAL

## 2024-06-02 ENCOUNTER — APPOINTMENT (OUTPATIENT)
Dept: CT IMAGING | Facility: CLINIC | Age: 34
End: 2024-06-02
Attending: PHYSICIAN ASSISTANT
Payer: COMMERCIAL

## 2024-06-02 DIAGNOSIS — R11.2 NAUSEA AND VOMITING: ICD-10-CM

## 2024-06-02 DIAGNOSIS — R10.84 GENERALIZED ABDOMINAL PAIN: ICD-10-CM

## 2024-06-02 LAB
ALBUMIN SERPL BCG-MCNC: 4.7 G/DL (ref 3.5–5.2)
ALBUMIN UR-MCNC: 10 MG/DL
ALP SERPL-CCNC: 53 U/L (ref 40–150)
ALT SERPL W P-5'-P-CCNC: 15 U/L (ref 0–50)
AMORPH CRY #/AREA URNS HPF: ABNORMAL /HPF
ANION GAP SERPL CALCULATED.3IONS-SCNC: 10 MMOL/L (ref 7–15)
APPEARANCE UR: ABNORMAL
AST SERPL W P-5'-P-CCNC: 17 U/L (ref 0–45)
BASOPHILS # BLD AUTO: 0.1 10E3/UL (ref 0–0.2)
BASOPHILS NFR BLD AUTO: 1 %
BILIRUB DIRECT SERPL-MCNC: <0.2 MG/DL (ref 0–0.3)
BILIRUB SERPL-MCNC: 0.3 MG/DL
BILIRUB UR QL STRIP: NEGATIVE
BUN SERPL-MCNC: 12.9 MG/DL (ref 6–20)
CALCIUM SERPL-MCNC: 9.4 MG/DL (ref 8.6–10)
CHLORIDE SERPL-SCNC: 106 MMOL/L (ref 98–107)
COLOR UR AUTO: YELLOW
CREAT SERPL-MCNC: 0.65 MG/DL (ref 0.51–0.95)
DEPRECATED HCO3 PLAS-SCNC: 24 MMOL/L (ref 22–29)
EGFRCR SERPLBLD CKD-EPI 2021: >90 ML/MIN/1.73M2
EOSINOPHIL # BLD AUTO: 0.2 10E3/UL (ref 0–0.7)
EOSINOPHIL NFR BLD AUTO: 2 %
ERYTHROCYTE [DISTWIDTH] IN BLOOD BY AUTOMATED COUNT: 13.5 % (ref 10–15)
GLUCOSE SERPL-MCNC: 99 MG/DL (ref 70–99)
GLUCOSE UR STRIP-MCNC: NEGATIVE MG/DL
HCG SERPL QL: NEGATIVE
HCT VFR BLD AUTO: 37.6 % (ref 35–47)
HGB BLD-MCNC: 12.4 G/DL (ref 11.7–15.7)
HGB UR QL STRIP: NEGATIVE
IMM GRANULOCYTES # BLD: 0 10E3/UL
IMM GRANULOCYTES NFR BLD: 0 %
KETONES UR STRIP-MCNC: NEGATIVE MG/DL
LEUKOCYTE ESTERASE UR QL STRIP: NEGATIVE
LIPASE SERPL-CCNC: 34 U/L (ref 13–60)
LYMPHOCYTES # BLD AUTO: 2.5 10E3/UL (ref 0.8–5.3)
LYMPHOCYTES NFR BLD AUTO: 26 %
MCH RBC QN AUTO: 30.7 PG (ref 26.5–33)
MCHC RBC AUTO-ENTMCNC: 33 G/DL (ref 31.5–36.5)
MCV RBC AUTO: 93 FL (ref 78–100)
MONOCYTES # BLD AUTO: 0.6 10E3/UL (ref 0–1.3)
MONOCYTES NFR BLD AUTO: 6 %
MUCOUS THREADS #/AREA URNS LPF: PRESENT /LPF
NEUTROPHILS # BLD AUTO: 6.3 10E3/UL (ref 1.6–8.3)
NEUTROPHILS NFR BLD AUTO: 65 %
NITRATE UR QL: NEGATIVE
NRBC # BLD AUTO: 0 10E3/UL
NRBC BLD AUTO-RTO: 0 /100
PH UR STRIP: 8 [PH] (ref 5–7)
PLATELET # BLD AUTO: 289 10E3/UL (ref 150–450)
POTASSIUM SERPL-SCNC: 4.4 MMOL/L (ref 3.4–5.3)
PROT SERPL-MCNC: 7.1 G/DL (ref 6.4–8.3)
RBC # BLD AUTO: 4.04 10E6/UL (ref 3.8–5.2)
RBC URINE: 1 /HPF
SODIUM SERPL-SCNC: 140 MMOL/L (ref 135–145)
SP GR UR STRIP: 1.02 (ref 1–1.03)
SQUAMOUS EPITHELIAL: 1 /HPF
UROBILINOGEN UR STRIP-MCNC: <2 MG/DL
WBC # BLD AUTO: 9.8 10E3/UL (ref 4–11)
WBC URINE: 3 /HPF

## 2024-06-02 PROCEDURE — 250N000011 HC RX IP 250 OP 636: Performed by: RADIOLOGY

## 2024-06-02 PROCEDURE — 250N000011 HC RX IP 250 OP 636: Performed by: PHYSICIAN ASSISTANT

## 2024-06-02 PROCEDURE — 96374 THER/PROPH/DIAG INJ IV PUSH: CPT | Mod: 59

## 2024-06-02 PROCEDURE — 99285 EMERGENCY DEPT VISIT HI MDM: CPT | Mod: 25

## 2024-06-02 PROCEDURE — 87086 URINE CULTURE/COLONY COUNT: CPT | Performed by: PHYSICIAN ASSISTANT

## 2024-06-02 PROCEDURE — 36415 COLL VENOUS BLD VENIPUNCTURE: CPT | Performed by: PHYSICIAN ASSISTANT

## 2024-06-02 PROCEDURE — 74177 CT ABD & PELVIS W/CONTRAST: CPT

## 2024-06-02 PROCEDURE — 83690 ASSAY OF LIPASE: CPT | Performed by: PHYSICIAN ASSISTANT

## 2024-06-02 PROCEDURE — 96361 HYDRATE IV INFUSION ADD-ON: CPT

## 2024-06-02 PROCEDURE — 258N000003 HC RX IP 258 OP 636: Performed by: PHYSICIAN ASSISTANT

## 2024-06-02 PROCEDURE — 96375 TX/PRO/DX INJ NEW DRUG ADDON: CPT

## 2024-06-02 PROCEDURE — 82248 BILIRUBIN DIRECT: CPT | Performed by: PHYSICIAN ASSISTANT

## 2024-06-02 PROCEDURE — 80053 COMPREHEN METABOLIC PANEL: CPT | Performed by: PHYSICIAN ASSISTANT

## 2024-06-02 PROCEDURE — 85025 COMPLETE CBC W/AUTO DIFF WBC: CPT | Performed by: PHYSICIAN ASSISTANT

## 2024-06-02 PROCEDURE — 84703 CHORIONIC GONADOTROPIN ASSAY: CPT | Performed by: PHYSICIAN ASSISTANT

## 2024-06-02 PROCEDURE — 81003 URINALYSIS AUTO W/O SCOPE: CPT | Performed by: PHYSICIAN ASSISTANT

## 2024-06-02 RX ORDER — KETOROLAC TROMETHAMINE 15 MG/ML
15 INJECTION, SOLUTION INTRAMUSCULAR; INTRAVENOUS ONCE
Status: COMPLETED | OUTPATIENT
Start: 2024-06-02 | End: 2024-06-02

## 2024-06-02 RX ORDER — IOPAMIDOL 755 MG/ML
100 INJECTION, SOLUTION INTRAVASCULAR ONCE
Status: COMPLETED | OUTPATIENT
Start: 2024-06-02 | End: 2024-06-02

## 2024-06-02 RX ORDER — ONDANSETRON 2 MG/ML
4 INJECTION INTRAMUSCULAR; INTRAVENOUS ONCE
Status: COMPLETED | OUTPATIENT
Start: 2024-06-02 | End: 2024-06-02

## 2024-06-02 RX ADMIN — ONDANSETRON 4 MG: 2 INJECTION INTRAMUSCULAR; INTRAVENOUS at 22:42

## 2024-06-02 RX ADMIN — IOPAMIDOL 100 ML: 755 INJECTION, SOLUTION INTRAVENOUS at 23:32

## 2024-06-02 RX ADMIN — KETOROLAC TROMETHAMINE 15 MG: 15 INJECTION, SOLUTION INTRAMUSCULAR; INTRAVENOUS at 22:42

## 2024-06-02 RX ADMIN — SODIUM CHLORIDE 1000 ML: 9 INJECTION, SOLUTION INTRAVENOUS at 22:42

## 2024-06-02 ASSESSMENT — COLUMBIA-SUICIDE SEVERITY RATING SCALE - C-SSRS
6. HAVE YOU EVER DONE ANYTHING, STARTED TO DO ANYTHING, OR PREPARED TO DO ANYTHING TO END YOUR LIFE?: NO
2. HAVE YOU ACTUALLY HAD ANY THOUGHTS OF KILLING YOURSELF IN THE PAST MONTH?: NO
1. IN THE PAST MONTH, HAVE YOU WISHED YOU WERE DEAD OR WISHED YOU COULD GO TO SLEEP AND NOT WAKE UP?: NO

## 2024-06-02 ASSESSMENT — ACTIVITIES OF DAILY LIVING (ADL): ADLS_ACUITY_SCORE: 37

## 2024-06-03 VITALS
HEART RATE: 64 BPM | SYSTOLIC BLOOD PRESSURE: 116 MMHG | RESPIRATION RATE: 18 BRPM | OXYGEN SATURATION: 98 % | DIASTOLIC BLOOD PRESSURE: 70 MMHG | HEIGHT: 63 IN | TEMPERATURE: 97.5 F | BODY MASS INDEX: 29.41 KG/M2

## 2024-06-03 NOTE — ED TRIAGE NOTES
Woke up with abd pain this am, radiates to lower back  +n/v  Denies any urinary symptoms       Triage Assessment (Adult)       Row Name 06/02/24 2225          Triage Assessment    Airway WDL WDL        Respiratory WDL    Respiratory WDL WDL        Skin Circulation/Temperature WDL    Skin Circulation/Temperature WDL WDL        Cardiac WDL    Cardiac WDL WDL        Peripheral/Neurovascular WDL    Peripheral Neurovascular WDL WDL        Cognitive/Neuro/Behavioral WDL    Cognitive/Neuro/Behavioral WDL WDL

## 2024-06-03 NOTE — ED PROVIDER NOTES
"EMERGENCY DEPARTMENT ENCOUNTER      NAME: Patricia Alberto  AGE: 33 year old female  YOB: 1990  MRN: [id number]  EVALUATION DATE & TIME: No admission date for patient encounter.    PCP: No Ref-Primary, Physician    ED PROVIDER: Adama Triana PA-C      Chief Complaint   Patient presents with    Abdominal Pain         FINAL IMPRESSION:  1. Generalized abdominal pain    2. Nausea and vomiting          ED COURSE & MEDICAL DECISION MAKING:    Pertinent Labs & Imaging studies reviewed. (See chart for details)  10:23 PM I met the patient and performed my initial interview and exam.   11:55 PM Patient updated on labs and imaging, plan for discharge.     33 year old female presents to the Emergency Department for evaluation of abdominal pain    ED Course as of 06/03/24 0000   Sun Jun 02, 2024 2226 Patient is a 33-year-old female, presents emergency department for evaluation of abdominal pain, not hypertensive, febrile, or tachycardic or tachypneic on arrival here.  Patient with a past medical history of chemical dependency, alcohol dependence, onychomycosis, headache, dysmenorrhea.  Patient reportedly woke up this morning with abdominal pain that radiates into her back.  Does endorse nausea and vomiting.  Denies any urinary symptoms.  Patient very anxious appearing here.  On examination, does have diffuse abdominal tenderness across the lower abdomen, she does not have any CVA tenderness.  Denies urinary complaints though reports \"she does not pee a lot\".  Denies any chance of pregnancy.  Does report that she just finished her menstrual cycle.  Has not taken any medications at home.  Denies any alcohol use.  Does smoke weed.  Does feel little bit nauseous here.  Will obtain basic labs here in the emergency department, broad differential considered including acute cystitis, less likely nephrolithiasis, appendicitis, or bowel obstruction.  Patient with previous history of cholecystectomy.  Basic labs " will be obtained, patient be given some Toradol, Zofran and fluids for symptom management.  She is otherwise nontoxic-appearing here in the emergency department.  For basic labs, imaging, and reassessment.   2309 No leukocytosis here, no anemia.   2312 Urinalysis here does show some amorphous cells, however otherwise no other acute abnormalities.  No evidence of urinary tract infection.  No blood.   2317 HCG Qualitative Serum: Negative   2320 Lipase here unremarkable, hepatic function normal, BMP does not show acute abnormalities.  Pending CT abdomen pelvis.  No acute abnormalities noted on lab work here to explain patient's pain.  Unlikely to be appendicitis, bowel obstruction, perforation given normal labs.   2341 I have independently reviewed the CT abdomen pelvis, patient does have distended bowel, no evidence of perforation or free air, pending final radiology read.   2355 CT Abdomen Pelvis w Contrast  CT abdomen pelvis here shows mild diffuse bladder wall thickening, recommend correlation for signs of cystitis.  No acute bowel findings.  Cholecystectomy.  Remainder of CT abdomen is unremarkable.   2359     Patient seen and reevaluated, updated on laboratory results here, no evidence of any acute abnormalities on CT or labs.  Unclear etiology for the patient's lower abdominal pain.  Will culture the urine here to ensure that there is no evidence of underlying infection given CT showing a little bit of bladder cystitis findings, patient not having any urine symptoms, will hold off on antibiotics here.  Recommend she follows up with GI and primary care.       Medical Decision Making  Obtained supplemental history:Supplemental history obtained?: Family Member/Significant Other  Reviewed external records: External records reviewed?: Outpatient Record: Outpatient office visit on 02/8/2022, outpatient office visit on 09/8/2021, outpatient office visit 09/16/2021  Care impacted by chronic illness:Mental Health  Care  significantly affected by social determinants of health:Access to Medical Care and Alcohol Abuse and/or Recreational Drug Use  Did you consider but not order tests?: Work up considered but not performed and documented in chart, if applicable  Did you interpret images independently?: Independent interpretation of ECG and images noted in documentation, when applicable.  Consultation discussion with other provider:Did you involve another provider (consultant, , pharmacy, etc.)?: No  Discharge. No recommendations on prescription strength medication(s). N/A.         At the conclusion of the encounter I discussed the results of all of the tests and the disposition. The questions were answered. The patient or family acknowledged understanding and was agreeable with the care plan.       0 minutes of critical care time     MEDICATIONS GIVEN IN THE EMERGENCY:  Medications   sodium chloride 0.9% BOLUS 1,000 mL (1,000 mLs Intravenous $New Bag 6/2/24 2242)   ondansetron (ZOFRAN) injection 4 mg (4 mg Intravenous $Given 6/2/24 2242)   ketorolac (TORADOL) injection 15 mg (15 mg Intravenous $Given 6/2/24 2242)   iopamidol (ISOVUE-370) solution 100 mL (100 mLs Intravenous $Given 6/2/24 2332)       NEW PRESCRIPTIONS STARTED AT TODAY'S ER VISIT  New Prescriptions    No medications on file          =================================================================    HPI    Patient information was obtained from: Patient     Use of : N/A       Patricia Alberto is a 33 year old female with a pertinent history of dysmenorrhea, headache, onychomycosis, tobacco use, chemical dependency, alcohol abuse who presents to this ED for evaluation of abdominal pain.  Patient reports that the abdominal pain has been worse for the last day or so.  Does have nausea and vomiting.  Reports persistent diarrhea.  No blood in her stool.  No blood in the vomit.  No dizziness.  No vaginal bleeding or discharge.  Does report that she just finished her  menstrual cycle.  Denies chance of pregnancy.    PAST MEDICAL HISTORY:  Past Medical History:   Diagnosis Date    Attention-deficit hyperactivity disorder     post-traumatic stress disorder followed by Judi Puentes.    Concussion with loss of consciousness     and LOC late August after falling down steps, air lifted to [facility] and hospitalized x 4 days.    Encounter for general adult medical examination without abnormal findings     08/27/09    Tinea unguium     No Comments Provided       PAST SURGICAL HISTORY:  Past Surgical History:   Procedure Laterality Date    CHOLECYSTECTOMY  2015    OTHER SURGICAL HISTORY      [id number],NO PREVIOUS SURGERY           CURRENT MEDICATIONS:    folic acid (FOLVITE) 1 MG tablet  hydrOXYzine (ATARAX) 25 MG tablet  multivitamin w/minerals (THERA-VIT-M) tablet  prazosin (MINIPRESS) 1 MG capsule  thiamine (B-1) 100 MG tablet         ALLERGIES:  Allergies   Allergen Reactions    Morphine Rash       FAMILY HISTORY:  Family History   Problem Relation Age of Onset    Family History Negative Mother         Good Health,44 yrs old    Other - See Comments Father         murdered at 52    Family History Negative Other         Good Health    Family History Negative Other         Good Health    Blood Disease No family hx of         Blood Disease    Heart Disease No family hx of         Heart Disease    Cholelithiasis Father         Cholecystecomy       SOCIAL HISTORY:   Social History     Socioeconomic History    Marital status: Single   Tobacco Use    Smoking status: Light Smoker     Current packs/day: 0.50     Average packs/day: 0.5 packs/day for 1 year (0.5 ttl pk-yrs)     Types: Cigarettes    Smokeless tobacco: Current    Tobacco comments:     Also vaps nicotine   Substance and Sexual Activity    Alcohol use: Yes     Alcohol/week: 0.0 standard drinks of alcohol    Drug use: Yes     Types: Other, Marijuana, Cocaine     Comment: Last used cocaine on Friday uses every day.  Thc  "daily ,last used Friday    Sexual activity: Yes     Partners: Male     Birth control/protection: None   Social History Narrative    She is single.  Works as a .      Plans to attend [employer] studying for ultrasound technology.      No tobacco, alcohol, or drugs.    p 9/23/2013.     Social Determinants of Health      Received from [facility] Cash Check Card [facility]    Financial Resource Strain    Received from [facility] Cash Check Card [facility] Immunity Project [facility]    Social Connections       VITALS:  /76   Pulse 80   Temp 97.5  F (36.4  C) (Oral)   Resp 18   Ht 1.6 m (5' 3\")   LMP 05/25/2024   SpO2 99%   BMI 29.41 kg/m      PHYSICAL EXAM    Physical Exam  Vitals and nursing note reviewed.   Constitutional:       General: She is not in acute distress.     Appearance: Normal appearance. She is normal weight. She is not toxic-appearing or diaphoretic.   HENT:      Right Ear: External ear normal.      Left Ear: External ear normal.   Eyes:      Conjunctiva/sclera: Conjunctivae normal.   Cardiovascular:      Rate and Rhythm: Normal rate and regular rhythm.      Heart sounds: Normal heart sounds.   Pulmonary:      Effort: Pulmonary effort is normal. No respiratory distress.      Breath sounds: No stridor. No wheezing or rales.   Abdominal:      General: Abdomen is flat. There is no distension.      Palpations: Abdomen is soft.      Tenderness: There is abdominal tenderness in the periumbilical area and suprapubic area. There is no right CVA tenderness, left CVA tenderness, guarding or rebound.   Neurological:      Mental Status: She is alert. Mental status is at baseline.           LAB:  All pertinent labs reviewed and interpreted.  Labs Ordered and Resulted from Time of ED Arrival to Time of ED Departure   ROUTINE UA WITH MICROSCOPIC REFLEX TO CULTURE - Abnormal       Result Value    Color Urine Yellow      Appearance Urine Cloudy (*)     Glucose Urine Negative      Bilirubin Urine " Negative      Ketones Urine Negative      Specific Gravity Urine 1.021      Blood Urine Negative      pH Urine 8.0 (*)     Protein Albumin Urine 10 (*)     Urobilinogen Urine <2.0      Nitrite Urine Negative      Leukocyte Esterase Urine Negative      Mucus Urine Present (*)     Amorphous Crystals Urine Many (*)     RBC Urine 1      WBC Urine 3      Squamous Epithelials Urine 1     BASIC METABOLIC PANEL - Normal    Sodium 140      Potassium 4.4      Chloride 106      Carbon Dioxide (CO2) 24      Anion Gap 10      Urea Nitrogen 12.9      Creatinine 0.65      GFR Estimate >90      Calcium 9.4      Glucose 99     HEPATIC FUNCTION PANEL - Normal    Protein Total 7.1      Albumin 4.7      Bilirubin Total 0.3      Alkaline Phosphatase 53      AST 17      ALT 15      Bilirubin Direct <0.20     LIPASE - Normal    Lipase 34     HCG QUALITATIVE PREGNANCY - Normal    hCG Serum Qualitative Negative     CBC WITH PLATELETS AND DIFFERENTIAL    WBC Count 9.8      RBC Count 4.04      Hemoglobin 12.4      Hematocrit 37.6      MCV 93      MCH 30.7      MCHC 33.0      RDW 13.5      Platelet Count 289      % Neutrophils 65      % Lymphocytes 26      % Monocytes 6      % Eosinophils 2      % Basophils 1      % Immature Granulocytes 0      NRBCs per 100 WBC 0      Absolute Neutrophils 6.3      Absolute Lymphocytes 2.5      Absolute Monocytes 0.6      Absolute Eosinophils 0.2      Absolute Basophils 0.1      Absolute Immature Granulocytes 0.0      Absolute NRBCs 0.0     URINE CULTURE       RADIOLOGY:  Reviewed all pertinent imaging. Please see official radiology report.  CT Abdomen Pelvis w Contrast   Final Result   IMPRESSION:    1.  Mild diffuse bladder wall thickening. Correlation for any signs of cystitis.      2.  No acute bowel findings. Normal appendix.      3.  Cholecystectomy. No biliary dilatation.      4.  Remainder of the exam is unremarkable.        PROCEDURES:   None.     Adama Triana PA-C  Emergency  [facility] EMERGENCY ROOM  [address] [facility] [address]  [phone]  Dept: [phone]       Adama Triana PA-C  06/03/24 0001

## 2024-06-03 NOTE — DISCHARGE INSTRUCTIONS
You are seen here in the emergency department for evaluation of diffuse abdominal pain.  Your laboratory studies here do not show any acute abnormalities.  As we discussed, your CT scan shows no acute abnormalities, mild amount of swelling around your bladder, however you are not having any urinary symptoms, and we can hold off on antibiotics here.  We will call you if we need to make any changes.  The number for [facility] is included below.      For pain or fever you may use:  -Tylenol 650 mg every 6 hours.  Max 4000 mg in 24 hours  Do not use thismedication with alcohol as it can cause liver problems.  -Ibuprofen 600 mg every 6 hours.  Max 3500 mg in 24 hours  Do not take this medication if you have a history of a GI bleed or have kidney problems.  You may use both of these medications at the same time or you can alternate them every 3 hours.  For example, Tylenol at 6 AM, ibuprofen at 9 AM, Tylenol at noon, etc.

## 2024-06-04 ENCOUNTER — TELEPHONE (OUTPATIENT)
Dept: NURSING | Facility: CLINIC | Age: 34
End: 2024-06-04
Payer: COMMERCIAL

## 2024-06-04 DIAGNOSIS — N39.0 URINARY TRACT INFECTION: Primary | ICD-10-CM

## 2024-06-04 LAB — BACTERIA UR CULT: ABNORMAL

## 2024-06-04 RX ORDER — CEFPODOXIME PROXETIL 100 MG/1
100 TABLET, FILM COATED ORAL 2 TIMES DAILY
Qty: 10 TABLET | Refills: 0 | Status: SHIPPED | OUTPATIENT
Start: 2024-06-04 | End: 2024-06-09

## 2024-06-04 NOTE — TELEPHONE ENCOUNTER
"[facility]     Reason for call: Lab Result Notification     Lab Result (including Rx patient on, if applicable).  If culture, copy of lab report at bottom.  Lab Result: Urine Culture - see below    No antibiotics prescribed in ED    Creatinine Level (mg/dl)   Creatinine   Date Value Ref Range Status   06/02/2024 0.65 0.51 - 0.95 mg/dL Final   12/07/2015 0.80 0.70 - 1.30 mg/dL     Creatinine clearance (ml/min), if applicable    Creatinine clearance cannot be calculated (Unknown ideal weight.)     Patient's current Symptoms:   \"Something is going on down there\" - feels pressure in pelvic area  Not hydrating well, no increased frequency, no pain or burning,    Allergic to ATBs: No   Breastfeeding: No   Pregnant: No   On Coumadin/Warfarin: No   Had any urinary procedures or have urinary   retention, neurogenic bladder, or use a catheter: No    Patient seen in [facility] ED on 6/2/24 - presenting with abdominal pain, nausea and vomiting    RN Recommendations/Instructions per [place] ED lab result protocol:   [facility] ED lab result protocol utilized: Urine Culture  Instruct to start antibiotic, sent to preferred pharmacy.     Patient/care giver notified to contact your PCP clinic or return to the Emergency department if your:  Symptoms return.  Symptoms do not improve after 3 days on antibiotic.  Symptoms do not resolve after completing antibiotic.  Symptoms worsen or other concerning symptoms.    Do Dykes RN  "

## 2025-03-09 ENCOUNTER — HEALTH MAINTENANCE LETTER (OUTPATIENT)
Age: 35
End: 2025-03-09

## 2025-05-05 ENCOUNTER — LAB REQUISITION (OUTPATIENT)
Dept: LAB | Facility: CLINIC | Age: 35
End: 2025-05-05
Payer: COMMERCIAL

## 2025-05-05 DIAGNOSIS — Z32.01 ENCOUNTER FOR PREGNANCY TEST, RESULT POSITIVE: ICD-10-CM

## 2025-05-05 PROCEDURE — 87086 URINE CULTURE/COLONY COUNT: CPT

## 2025-05-06 LAB — BACTERIA UR CULT: NO GROWTH

## 2025-05-19 ENCOUNTER — LAB REQUISITION (OUTPATIENT)
Dept: LAB | Facility: CLINIC | Age: 35
End: 2025-05-19

## 2025-05-19 DIAGNOSIS — Z3A.10 10 WEEKS GESTATION OF PREGNANCY: ICD-10-CM

## 2025-05-19 LAB
BASOPHILS # BLD AUTO: 0 10E3/UL (ref 0–0.2)
BASOPHILS NFR BLD AUTO: 0 %
EOSINOPHIL # BLD AUTO: 0.1 10E3/UL (ref 0–0.7)
EOSINOPHIL NFR BLD AUTO: 1 %
ERYTHROCYTE [DISTWIDTH] IN BLOOD BY AUTOMATED COUNT: 14.3 % (ref 10–15)
HCT VFR BLD AUTO: 41.2 % (ref 35–47)
HGB BLD-MCNC: 13.8 G/DL (ref 11.7–15.7)
IMM GRANULOCYTES # BLD: 0.1 10E3/UL
IMM GRANULOCYTES NFR BLD: 1 %
LYMPHOCYTES # BLD AUTO: 1.9 10E3/UL (ref 0.8–5.3)
LYMPHOCYTES NFR BLD AUTO: 14 %
MCH RBC QN AUTO: 30.1 PG (ref 26.5–33)
MCHC RBC AUTO-ENTMCNC: 33.5 G/DL (ref 31.5–36.5)
MCV RBC AUTO: 90 FL (ref 78–100)
MONOCYTES # BLD AUTO: 0.9 10E3/UL (ref 0–1.3)
MONOCYTES NFR BLD AUTO: 7 %
NEUTROPHILS # BLD AUTO: 10.3 10E3/UL (ref 1.6–8.3)
NEUTROPHILS NFR BLD AUTO: 78 %
NRBC # BLD AUTO: 0 10E3/UL
NRBC BLD AUTO-RTO: 0 /100
PLATELET # BLD AUTO: 320 10E3/UL (ref 150–450)
RBC # BLD AUTO: 4.58 10E6/UL (ref 3.8–5.2)
WBC # BLD AUTO: 13.2 10E3/UL (ref 4–11)

## 2025-05-19 PROCEDURE — 85004 AUTOMATED DIFF WBC COUNT: CPT | Performed by: OBSTETRICS & GYNECOLOGY
